# Patient Record
Sex: MALE | Race: BLACK OR AFRICAN AMERICAN | NOT HISPANIC OR LATINO | Employment: UNEMPLOYED | URBAN - METROPOLITAN AREA
[De-identification: names, ages, dates, MRNs, and addresses within clinical notes are randomized per-mention and may not be internally consistent; named-entity substitution may affect disease eponyms.]

---

## 2019-05-20 ENCOUNTER — OFFICE VISIT (OUTPATIENT)
Dept: GASTROENTEROLOGY | Facility: CLINIC | Age: 34
End: 2019-05-20
Payer: COMMERCIAL

## 2019-05-20 VITALS
HEIGHT: 71 IN | SYSTOLIC BLOOD PRESSURE: 146 MMHG | DIASTOLIC BLOOD PRESSURE: 88 MMHG | BODY MASS INDEX: 33.15 KG/M2 | HEART RATE: 70 BPM | WEIGHT: 236.8 LBS

## 2019-05-20 DIAGNOSIS — K62.89 RECTAL PAIN: Primary | ICD-10-CM

## 2019-05-20 DIAGNOSIS — K21.9 GASTROESOPHAGEAL REFLUX DISEASE WITHOUT ESOPHAGITIS: ICD-10-CM

## 2019-05-20 PROCEDURE — 99203 OFFICE O/P NEW LOW 30 MIN: CPT | Performed by: PHYSICIAN ASSISTANT

## 2019-05-28 ENCOUNTER — TELEPHONE (OUTPATIENT)
Dept: GASTROENTEROLOGY | Facility: CLINIC | Age: 34
End: 2019-05-28

## 2019-05-30 ENCOUNTER — TELEPHONE (OUTPATIENT)
Dept: GASTROENTEROLOGY | Facility: CLINIC | Age: 34
End: 2019-05-30

## 2021-05-11 ENCOUNTER — OFFICE VISIT (OUTPATIENT)
Dept: UROLOGY | Facility: CLINIC | Age: 36
End: 2021-05-11
Payer: COMMERCIAL

## 2021-05-11 VITALS
DIASTOLIC BLOOD PRESSURE: 102 MMHG | BODY MASS INDEX: 34.27 KG/M2 | HEART RATE: 76 BPM | SYSTOLIC BLOOD PRESSURE: 138 MMHG | WEIGHT: 244.8 LBS | HEIGHT: 71 IN

## 2021-05-11 DIAGNOSIS — Q55.69 WEBBED PENIS: Primary | ICD-10-CM

## 2021-05-11 PROCEDURE — 99204 OFFICE O/P NEW MOD 45 MIN: CPT | Performed by: PHYSICIAN ASSISTANT

## 2021-05-11 RX ORDER — AMINOCAPROIC ACID 500 MG/1
TABLET ORAL EVERY 6 HOURS
COMMUNITY
End: 2021-06-29

## 2021-05-11 RX ORDER — DIPHENOXYLATE HYDROCHLORIDE AND ATROPINE SULFATE 2.5; .025 MG/1; MG/1
1 TABLET ORAL DAILY
COMMUNITY

## 2021-05-11 NOTE — PROGRESS NOTES
1  Webbed penis  Case request operating room: SCROTOPLASTY    Case request operating room: SCROTOPLASTY       Assessment and plan:       1  Scrotal Webbing  - Patient was seen and examined both by myself and Dr Sophia Grullon today  Scrotal webbing noted at the penile scrotal junction  We reviewed options for observation versus scrotoplasty  Discussed the operative procedure including risks of bleeding, infection, and cosmetic changes  Also reviewed postoperative restrictions  Patient verbalized understanding and wishes to proceed with scrotoplasty at this time  All questions answered  Hector Ibarra PA-C      Chief Complaint     Chief Complaint   Patient presents with    Scrotal Webbing     pain w/ exercise, certain boxers       History of Present Illness     Daryle Big is a 38 y o  Male presenting today as a new patient for penile webbing  Patient states that he noticed this approximately 10-15 years ago  He does feel like it has worsened over the past few years however  He typically will notice this as he will have pain with penetrative intercourse due to the tension at the webbing  Does not typically have any pain with spontaneous erections however  He also notices difficulties with certain activities with pulling at this site  He has been doing conservative measures however overall bothersome to him  Patient does admit to a vasectomy approximately 31years of age  No complications thereafter  Patient denies any urinary issues  Denies any dysuria, gross hematuria, or urinary infections  Denies any history of coagulopathy/bleeding disorders or use of anticoagulation  Review of Systems     Review of Systems   Constitutional: Negative for activity change, appetite change, chills, diaphoresis, fatigue, fever and unexpected weight change  Respiratory: Negative for chest tightness and shortness of breath      Cardiovascular: Negative for chest pain, palpitations and leg swelling  Gastrointestinal: Negative for abdominal distention, abdominal pain, constipation, diarrhea, nausea and vomiting  Genitourinary: Negative for decreased urine volume, difficulty urinating, dysuria, enuresis, flank pain, frequency, genital sores, hematuria and urgency  Musculoskeletal: Negative for back pain, gait problem and myalgias  Skin: Negative for color change, pallor, rash and wound  Psychiatric/Behavioral: Negative for behavioral problems  The patient is not nervous/anxious  Allergies     No Known Allergies    Physical Exam     Physical Exam  Constitutional:       General: He is not in acute distress  Appearance: Normal appearance  He is normal weight  He is not ill-appearing, toxic-appearing or diaphoretic  HENT:      Head: Normocephalic and atraumatic  Eyes:      General:         Right eye: No discharge  Left eye: No discharge  Conjunctiva/sclera: Conjunctivae normal    Pulmonary:      Effort: Pulmonary effort is normal  No respiratory distress  Genitourinary:     Comments: Scrotal webbing present to approximately 1/3 of proximal penile shaft  No erythema or skin breakdown  Musculoskeletal: Normal range of motion  General: No swelling or tenderness  Skin:     General: Skin is warm and dry  Neurological:      General: No focal deficit present  Mental Status: He is alert and oriented to person, place, and time  Psychiatric:         Mood and Affect: Mood normal          Behavior: Behavior normal          Thought Content:  Thought content normal          Judgment: Judgment normal            Vital Signs     Vitals:    05/11/21 1034   BP: (!) 138/102   BP Location: Left arm   Patient Position: Sitting   Cuff Size: Large   Pulse: 76   Weight: 111 kg (244 lb 12 8 oz)   Height: 5' 11" (1 803 m)         Current Medications       Current Outpatient Medications:     aminocaproic acid (AMICAR) 500 mg tablet, Take by mouth every 6 (six) hours, Disp: , Rfl:     b complex vitamins tablet, Take 1 tablet by mouth daily, Disp: , Rfl:     multivitamin (THERAGRAN) TABS, Take 1 tablet by mouth daily, Disp: , Rfl:       Active Problems     There is no problem list on file for this patient  Past Medical History     History reviewed  No pertinent past medical history        Surgical History     Past Surgical History:   Procedure Laterality Date    VASECTOMY      VASECTOMY  2016         Family History     Family History   Problem Relation Age of Onset    No Known Problems Father     No Known Problems Paternal Grandmother     No Known Problems Daughter     No Known Problems Daughter     No Known Problems Son          Social History     Social History       Radiology

## 2021-05-12 ENCOUNTER — TELEPHONE (OUTPATIENT)
Dept: UROLOGY | Facility: CLINIC | Age: 36
End: 2021-05-12

## 2021-05-12 ENCOUNTER — PREP FOR PROCEDURE (OUTPATIENT)
Dept: UROLOGY | Facility: CLINIC | Age: 36
End: 2021-05-12

## 2021-06-29 NOTE — PRE-PROCEDURE INSTRUCTIONS
Pre-Surgery Instructions:   Medication Instructions    b complex vitamins tablet Instructed to avoid all ASA/NSAIDs and OTC Vit/Supp from now until after surgery  Tylenol ok prn    multivitamin (THERAGRAN) TABS Instructed to avoid all ASA/NSAIDs and OTC Vit/Supp from now until after surgery  Tylenol ok prn    Pre-op medication, and showering instructions with antibacteral soap reviewed  Instructed to avoid all ASA/NSAIDs and OTC Vit/Supp from now until after surgery  Tylenol ok prn  Pt  Verbalized an understanding of all instructions reviewed and offers no concerns at this time

## 2021-06-30 NOTE — TELEPHONE ENCOUNTER
Erasmo Zee from [facility] calling for patients consent for surgery scheduled for tomorrow   Fax to [phone]

## 2021-07-01 ENCOUNTER — TELEPHONE (OUTPATIENT)
Dept: UROLOGY | Facility: CLINIC | Age: 36
End: 2021-07-01

## 2021-07-01 ENCOUNTER — ANESTHESIA (OUTPATIENT)
Dept: PERIOP | Facility: HOSPITAL | Age: 36
End: 2021-07-01
Payer: COMMERCIAL

## 2021-07-01 ENCOUNTER — ANESTHESIA EVENT (OUTPATIENT)
Dept: PERIOP | Facility: HOSPITAL | Age: 36
End: 2021-07-01
Payer: COMMERCIAL

## 2021-07-01 ENCOUNTER — HOSPITAL ENCOUNTER (OUTPATIENT)
Facility: HOSPITAL | Age: 36
Setting detail: OUTPATIENT SURGERY
Discharge: HOME | End: 2021-07-01
Attending: UROLOGY | Admitting: UROLOGY
Payer: COMMERCIAL

## 2021-07-01 VITALS
TEMPERATURE: 97.1 F | DIASTOLIC BLOOD PRESSURE: 55 MMHG | SYSTOLIC BLOOD PRESSURE: 117 MMHG | OXYGEN SATURATION: 98 % | WEIGHT: 245.37 LBS | BODY MASS INDEX: 34.35 KG/M2 | HEIGHT: 71 IN | RESPIRATION RATE: 16 BRPM | HEART RATE: 71 BPM

## 2021-07-01 DIAGNOSIS — Q55.69 WEBBED PENIS: Primary | ICD-10-CM

## 2021-07-01 PROCEDURE — 88304 TISSUE EXAM BY PATHOLOGIST: CPT | Performed by: PATHOLOGY

## 2021-07-01 PROCEDURE — 55175 REVISION OF SCROTUM: CPT | Performed by: UROLOGY

## 2021-07-01 PROCEDURE — NC001 PR NO CHARGE: Performed by: UROLOGY

## 2021-07-01 RX ORDER — DEXMEDETOMIDINE HYDROCHLORIDE 100 UG/ML
INJECTION, SOLUTION INTRAVENOUS AS NEEDED
Status: DISCONTINUED | OUTPATIENT
Start: 2021-07-01 | End: 2021-07-01

## 2021-07-01 RX ORDER — FENTANYL CITRATE/PF 50 MCG/ML
50 SYRINGE (ML) INJECTION
Status: DISCONTINUED | OUTPATIENT
Start: 2021-07-01 | End: 2021-07-01 | Stop reason: HOSPADM

## 2021-07-01 RX ORDER — PROPOFOL 10 MG/ML
INJECTION, EMULSION INTRAVENOUS AS NEEDED
Status: DISCONTINUED | OUTPATIENT
Start: 2021-07-01 | End: 2021-07-01

## 2021-07-01 RX ORDER — BUPIVACAINE HYDROCHLORIDE 2.5 MG/ML
INJECTION, SOLUTION EPIDURAL; INFILTRATION; INTRACAUDAL AS NEEDED
Status: DISCONTINUED | OUTPATIENT
Start: 2021-07-01 | End: 2021-07-01 | Stop reason: HOSPADM

## 2021-07-01 RX ORDER — OXYCODONE HYDROCHLORIDE 5 MG/1
5 TABLET ORAL EVERY 4 HOURS PRN
Status: DISCONTINUED | OUTPATIENT
Start: 2021-07-01 | End: 2021-07-01 | Stop reason: HOSPADM

## 2021-07-01 RX ORDER — FENTANYL CITRATE 50 UG/ML
INJECTION, SOLUTION INTRAMUSCULAR; INTRAVENOUS AS NEEDED
Status: DISCONTINUED | OUTPATIENT
Start: 2021-07-01 | End: 2021-07-01

## 2021-07-01 RX ORDER — ONDANSETRON 2 MG/ML
INJECTION INTRAMUSCULAR; INTRAVENOUS AS NEEDED
Status: DISCONTINUED | OUTPATIENT
Start: 2021-07-01 | End: 2021-07-01

## 2021-07-01 RX ORDER — SODIUM CHLORIDE, SODIUM LACTATE, POTASSIUM CHLORIDE, CALCIUM CHLORIDE 600; 310; 30; 20 MG/100ML; MG/100ML; MG/100ML; MG/100ML
125 INJECTION, SOLUTION INTRAVENOUS CONTINUOUS
Status: DISCONTINUED | OUTPATIENT
Start: 2021-07-01 | End: 2021-07-01 | Stop reason: HOSPADM

## 2021-07-01 RX ORDER — LIDOCAINE HYDROCHLORIDE 10 MG/ML
INJECTION, SOLUTION EPIDURAL; INFILTRATION; INTRACAUDAL; PERINEURAL AS NEEDED
Status: DISCONTINUED | OUTPATIENT
Start: 2021-07-01 | End: 2021-07-01

## 2021-07-01 RX ORDER — DIPHENHYDRAMINE HYDROCHLORIDE 50 MG/ML
12.5 INJECTION INTRAMUSCULAR; INTRAVENOUS ONCE AS NEEDED
Status: DISCONTINUED | OUTPATIENT
Start: 2021-07-01 | End: 2021-07-01 | Stop reason: HOSPADM

## 2021-07-01 RX ORDER — DEXAMETHASONE SODIUM PHOSPHATE 10 MG/ML
INJECTION, SOLUTION INTRAMUSCULAR; INTRAVENOUS AS NEEDED
Status: DISCONTINUED | OUTPATIENT
Start: 2021-07-01 | End: 2021-07-01

## 2021-07-01 RX ORDER — ACETAMINOPHEN 325 MG/1
650 TABLET ORAL EVERY 4 HOURS PRN
Qty: 30 TABLET | Refills: 0
Start: 2021-07-01

## 2021-07-01 RX ORDER — CEFAZOLIN SODIUM 2 G/50ML
SOLUTION INTRAVENOUS AS NEEDED
Status: DISCONTINUED | OUTPATIENT
Start: 2021-07-01 | End: 2021-07-01

## 2021-07-01 RX ORDER — NAPROXEN 500 MG/1
500 TABLET ORAL 2 TIMES DAILY WITH MEALS
Qty: 10 TABLET | Refills: 0 | Status: SHIPPED | OUTPATIENT
Start: 2021-07-01 | End: 2021-12-27

## 2021-07-01 RX ORDER — HYDROMORPHONE HCL/PF 1 MG/ML
0.4 SYRINGE (ML) INJECTION
Status: DISCONTINUED | OUTPATIENT
Start: 2021-07-01 | End: 2021-07-01 | Stop reason: HOSPADM

## 2021-07-01 RX ORDER — MAGNESIUM HYDROXIDE 1200 MG/15ML
LIQUID ORAL AS NEEDED
Status: DISCONTINUED | OUTPATIENT
Start: 2021-07-01 | End: 2021-07-01 | Stop reason: HOSPADM

## 2021-07-01 RX ORDER — KETAMINE HCL IN NACL, ISO-OSM 100MG/10ML
SYRINGE (ML) INJECTION AS NEEDED
Status: DISCONTINUED | OUTPATIENT
Start: 2021-07-01 | End: 2021-07-01

## 2021-07-01 RX ORDER — OXYCODONE HYDROCHLORIDE 5 MG/1
5 TABLET ORAL EVERY 4 HOURS PRN
Qty: 5 TABLET | Refills: 0 | Status: SHIPPED | OUTPATIENT
Start: 2021-07-01 | End: 2021-07-06

## 2021-07-01 RX ORDER — MIDAZOLAM HYDROCHLORIDE 2 MG/2ML
INJECTION, SOLUTION INTRAMUSCULAR; INTRAVENOUS AS NEEDED
Status: DISCONTINUED | OUTPATIENT
Start: 2021-07-01 | End: 2021-07-01

## 2021-07-01 RX ORDER — KETOROLAC TROMETHAMINE 30 MG/ML
INJECTION, SOLUTION INTRAMUSCULAR; INTRAVENOUS AS NEEDED
Status: DISCONTINUED | OUTPATIENT
Start: 2021-07-01 | End: 2021-07-01

## 2021-07-01 RX ORDER — GLYCOPYRROLATE 0.2 MG/ML
INJECTION INTRAMUSCULAR; INTRAVENOUS AS NEEDED
Status: DISCONTINUED | OUTPATIENT
Start: 2021-07-01 | End: 2021-07-01

## 2021-07-01 RX ORDER — ONDANSETRON 2 MG/ML
4 INJECTION INTRAMUSCULAR; INTRAVENOUS ONCE AS NEEDED
Status: DISCONTINUED | OUTPATIENT
Start: 2021-07-01 | End: 2021-07-01 | Stop reason: HOSPADM

## 2021-07-01 RX ORDER — CEFAZOLIN SODIUM 2 G/50ML
2000 SOLUTION INTRAVENOUS ONCE
Status: DISCONTINUED | OUTPATIENT
Start: 2021-07-01 | End: 2021-07-01 | Stop reason: HOSPADM

## 2021-07-01 RX ADMIN — LIDOCAINE HYDROCHLORIDE 100 MG: 10 INJECTION, SOLUTION EPIDURAL; INFILTRATION; INTRACAUDAL; PERINEURAL at 14:04

## 2021-07-01 RX ADMIN — DEXMEDETOMIDINE HCL 8 MCG: 100 INJECTION INTRAVENOUS at 14:04

## 2021-07-01 RX ADMIN — PROPOFOL 200 MG: 10 INJECTION, EMULSION INTRAVENOUS at 14:04

## 2021-07-01 RX ADMIN — DEXMEDETOMIDINE HCL 8 MCG: 100 INJECTION INTRAVENOUS at 14:08

## 2021-07-01 RX ADMIN — FENTANYL CITRATE 50 MCG: 50 INJECTION, SOLUTION INTRAMUSCULAR; INTRAVENOUS at 14:07

## 2021-07-01 RX ADMIN — MIDAZOLAM HYDROCHLORIDE 2 MG: 1 INJECTION, SOLUTION INTRAMUSCULAR; INTRAVENOUS at 13:59

## 2021-07-01 RX ADMIN — DEXMEDETOMIDINE HCL 8 MCG: 100 INJECTION INTRAVENOUS at 14:12

## 2021-07-01 RX ADMIN — GLYCOPYRROLATE 0.2 MG: 0.2 INJECTION, SOLUTION INTRAMUSCULAR; INTRAVENOUS at 14:04

## 2021-07-01 RX ADMIN — ONDANSETRON 4 MG: 2 INJECTION INTRAMUSCULAR; INTRAVENOUS at 14:04

## 2021-07-01 RX ADMIN — CEFAZOLIN SODIUM 2000 MG: 2 SOLUTION INTRAVENOUS at 13:47

## 2021-07-01 RX ADMIN — DEXAMETHASONE SODIUM PHOSPHATE 10 MG: 10 INJECTION, SOLUTION INTRAMUSCULAR; INTRAVENOUS at 14:04

## 2021-07-01 RX ADMIN — KETOROLAC TROMETHAMINE 15 MG: 30 INJECTION, SOLUTION INTRAMUSCULAR at 15:14

## 2021-07-01 RX ADMIN — FENTANYL CITRATE 50 MCG: 50 INJECTION, SOLUTION INTRAMUSCULAR; INTRAVENOUS at 14:10

## 2021-07-01 RX ADMIN — SODIUM CHLORIDE, SODIUM LACTATE, POTASSIUM CHLORIDE, AND CALCIUM CHLORIDE 125 ML/HR: .6; .31; .03; .02 INJECTION, SOLUTION INTRAVENOUS at 12:09

## 2021-07-01 RX ADMIN — Medication 25 MG: at 14:14

## 2021-07-01 NOTE — OP NOTE
Operative Note     PATIENT:  Governor Tan (MRN [id number])    DATE OF PROCEDURE:   7/1/2021    PRE-OP DIAGNOSIS:   1) penoscrotal webbing    POST-OP DIAGNOSIS:   1) penoscrotal webbing    PROCEDURES PERFORMED:  1) scrotoplasty    SURGEON:  Pancho Vega MD    ASSISTANTS:    NOTE:  There were no qualified teaching residents to assist with this case    ANESTHESIA: General     COMPLICATIONS:   None    ANTIBIOTICS:  Cephazolin    INTRAOPERATIVE THROMBOEMBOLISM PROPHYLAXIS:  Pneumatic compression stockings     INDICATIONS FOR PROCEDURE:  Governor Tan is an 38 y o  old male with penoscrotal webbing  Patient's scrotum inserts at the mid shaft  He is symptomatic and desires surgical repair  We discussed options including conservative/expected versus formal repair with a scrotoplasty  Various techniques were discussed  After discussing the options, the patient elected to undergo a scrotoplasty  We discussed the procedure in detail, the alternatives, and the risks, and they signed informed consent to proceed  PROCEDURE IN DETAIL:     Before induction of anesthesia, I examined the patient with him awake in the preop holding area and marked the excess scrotal skin for planned excision to our mutual satisfaction  The patient was identified and brought to the OR  Antibiotic prophylaxis and DVT prophylaxis were administered  They were placed in the comfortable supine position with care to pad all pressure points  The scrotal hair was clipped and they were prepped and draped in the usual sterile fashion using ChloraPrep  A surgical time out was performed with all in the room in agreement with the correct patient, procedure, indications, and laterality          The shaft of the penis in the base of the scrotum and scrotal contents were placed on gentle retraction and a v-shaped incision was made along the ventral surface of the base of the penis superiorly, and along the anterior surface of the scrotum inferiorly  This was carried down through the cremasteric fibers using electrocautery  Care was taken to avoid any injury to the corporal bodies and scrotal contents  The tunica vaginalis was violated in 2 locations these were carefully closed to avoid any leakage of hydrocele fluid  The skin was completely excised using electrocautery and submitted as specimen  The wound was copiously irrigated and hemostasis was meticulously ensured using Bovie electrocautery  Next the incision was closed longitudinally utilizing multiple 3-0 chromic sutures in a horizontal mattress interrupted fashion  This gave an excellent result  And I was satisfied with complete closure and excellent cosmesis, the skin was cleansed  The skin was closed in addition using a thin layer of Exofin glue  A sterile dressing and scrotal support was applied  All needle and instrument counts were correct  The patient was placed back supine, awakened from general anesthesia and brought to recovery room in stable condition      ESTIMATED BLOOD LOSS:  Minimal      DRAINS:    None    SPECIMENS:   Order Name Source Comment Collection Info Order Time   TISSUE EXAM Skin, Other SCROTAL SKIN  Collected By: Cele Nath MD 7/1/2021  2:34 PM     [facility]   Immediate             COMPLICATIONS: None    DISPOSITION: PACU

## 2021-07-01 NOTE — TELEPHONE ENCOUNTER
Status post scrotoplasty    Please schedule a follow-up postop visit with me in 1-2 weeks, ideally on a day when SERGE Schwartz is in the office as well  Can double book the visit with me      There should be availability on July 8th

## 2021-07-01 NOTE — H&P
UROLOGY HISTORY AND PHYSICAL     Patient Identifiers: Rosaline Wick (MRN [id number])      Date of Service: 2021        ASSESSMENT:     38 y o  old male with penoscrotal webbing   PLAN:     To OR for scrotoplasty      History of Present Illness:     Rosaline Wick is a 38 y o  old with a history of penoscrotal webbing    Past Medical, Past Surgical History:   History reviewed  No pertinent past medical history :    Past Surgical History:   Procedure Laterality Date    VASECTOMY      VASECTOMY  2016   :    Medications, Allergies:     Current Facility-Administered Medications:     ceFAZolin (ANCEF) IVPB (premix in dextrose) 2,000 mg 50 mL, 2,000 mg, Intravenous, Once, Sandi Yang PA-C    lactated ringers infusion, 125 mL/hr, Intravenous, Continuous, Tigre Ruggiero MD, Last Rate: 125 mL/hr at 21 1209, 125 mL/hr at 21 1209    Allergies:  No Known Allergies:    Social and Family History:   Social History:   Social History     Tobacco Use    Smoking status: Former Smoker     Packs/day: 0 25     Quit date: 2021     Years since quittin 2    Smokeless tobacco: Never Used   Vaping Use    Vaping Use: Never used   Substance Use Topics    Alcohol use: Yes     Comment: social 3x a year    Drug use: Never     Social History     Tobacco Use   Smoking Status Former Smoker    Packs/day: 0 25    Quit date: 2021    Years since quittin 2   Smokeless Tobacco Never Used       Family History:  Family History   Problem Relation Age of Onset    No Known Problems Father     No Known Problems Paternal Grandmother     No Known Problems Daughter     No Known Problems Daughter     No Known Problems Son    :     Review of Systems:     General: Fever, chills, or night sweats: negative  Cardiac: Negative for chest pain  Pulmonary: Negative for shortness of breath  Gastrointestinal: Abdominal pain negative  Nausea, vomiting, or diarrhea negative  Genitourinary: See HPI above  Patient does nothave hematuria  All other systems queried were negative  Physical Exam:   General: Patient is pleasant and in NAD  Awake and alert  /57   Pulse 65   Temp 98 3 °F (36 8 °C) (Temporal)   Resp 20   Ht 5' 11" (1 803 m)   Wt 111 kg (245 lb 6 oz)   SpO2 97%   BMI 34 22 kg/m²   HEENT:  Normocephalic atraumatic  Cardiac:  Regular rate and rhythm, Peripheral edema: negative  Pulmonary: Non-labored breathing, CTAB  Abdomen: Soft, non-tender, non-distended  No surgical scars  No masses, tenderness, hernias noted  Genitourinary: negative CVA tenderness, neg suprapubic tenderness  Extremities: normal movement in all 4       Labs:   No results found for: HGB, HCT, WBC, PLT]    No results found for: NA, K, CL, CO2, BUN, CREATININE, CALCIUM, GLUCOSE]    Imaging:   I personally reviewed the images and report of the following studies, and reviewed them with the patient:        Thank you for allowing me to participate in this patients care  Please do not hesitate to call with any additional questions    Manuela Frazier MD

## 2021-07-01 NOTE — DISCHARGE INSTRUCTIONS
Scrotoplasty  WHAT YOU NEED TO KNOW:   A scrotoplasty is a surgery to repair penoscrotal webbing  DISCHARGE INSTRUCTIONS:     Please purchase to appropriately fitting jock straps, wear them at all times until your postoperative visit, placing clean gauze underneath the scrotum to provide further elevation  Medicines:   · Pain medicine: You may need medicine to take away or decrease pain  ? Learn how to take your medicine  Ask what medicine and how much you should take  Be sure you know how, when, and how often to take it  ? Do not wait until the pain is severe before you take your medicine  Tell your healthcare provider if your pain does not decrease  ? Pain medicine can make you dizzy or sleepy  Prevent falls by calling someone when you get out of bed or if you need help  Take your medicine as directed  Contact your healthcare provider if you think your medicine is not helping or if you have side effects  Tell him of her if you are allergic to any medicine  Keep a list of the medicines, vitamins, and herbs you take  Include the amounts, and when and why you take them  Bring the list or the pill bottles to follow-up visits  Carry your medicine list with you in case of an emergency  Follow up with your healthcare provider or urologist as directed:  Write down your questions so you remember to ask them during your visits  Support: You may need to wear a fabric support device similar to a jock strap to decrease swelling  Contact your healthcare provider or urologist if:   · The swelling gets worse or does not go away  · You have questions or concerns about your condition or care  Return to the emergency department if:   · You have severe pain in your scrotum  · You have a fever and your scrotum is red and swollen  © Copyright [address] Information is for End User's use only and may not be sold, redistributed or otherwise used for commercial purposes   All illustrations and images included in CareNotes® are the copyrighted property of A D A M [employer] Inc  or [employer]  The above information is an  only  It is not intended as medical advice for individual conditions or treatments  Talk to your doctor, nurse or pharmacist before following any medical regimen to see if it is safe and effective for you

## 2021-07-01 NOTE — ANESTHESIA POSTPROCEDURE EVALUATION
Post-Op Assessment Note    CV Status:  Stable  Pain Score: 0    Pain management: adequate     Mental Status:  Sleepy   Hydration Status:  Euvolemic   PONV Controlled:  Controlled   Airway Patency:  Patent and adequate      Post Op Vitals Reviewed: Yes      Staff: CRNA, Anesthesiologist   Comments: Oral airway in place  RN aware        No complications documented      BP (P) 119/56 (07/01/21 1532)    Temp (P) 98 7 °F (37 1 °C) (07/01/21 1532)    Pulse  81   Resp      SpO2   96%

## 2021-07-01 NOTE — ANESTHESIA PREPROCEDURE EVALUATION
Procedure:  SCROTOPLASTY (N/A Scrotum)    Relevant Problems   No relevant active problems      Webbed penis   Recently quit smoking  Obesity  Physical Exam    Airway    Mallampati score: III  TM Distance: >3 FB  Neck ROM: full     Dental   Comment: Denies loose teeth,     Cardiovascular  Cardiovascular exam normal    Pulmonary  Pulmonary exam normal     Other Findings  Portions of exam deferred due to low yield and/or unknown COVID status      Anesthesia Plan  ASA Score- 2     Anesthesia Type- general with ASA Monitors  Additional Monitors:   Airway Plan: LMA  Plan Factors-Exercise tolerance (METS): >4 METS  Chart reviewed  Existing labs reviewed  Patient summary reviewed  Patient is not a current smoker  Induction- intravenous  Postoperative Plan-     Informed Consent- Anesthetic plan and risks discussed with patient  I personally reviewed this patient with the CRNA  Discussed and agreed on the Anesthesia Plan with the CRNA  Gege Moraes

## 2021-07-02 ENCOUNTER — TELEPHONE (OUTPATIENT)
Dept: UROLOGY | Facility: MEDICAL CENTER | Age: 36
End: 2021-07-02

## 2021-07-02 NOTE — TELEPHONE ENCOUNTER
pts care is managed by Ashlyn Davidson  Last seen 7/1/21  Scrotoplasty  Pt reports as of today 1 stitch has fallen out of surgical site  Pt states area was actively bleeding today 1130 am  Pt states he placed neosporin and re wrapped area  Pt states area where stitch was is actively bleeding  Please advise further instructions

## 2021-07-02 NOTE — TELEPHONE ENCOUNTER
Patient of Dr Hudson Truong in [facility]  Patient had procedure yesterday and has questions regarding his limitations  Please call at [phone]

## 2021-07-02 NOTE — TELEPHONE ENCOUNTER
Reviewed with Dr Davis Reap - patient should continue with neosporin and keeping area covered, and bleeding should stop soon  Additional restrictions include avoidance of intercourse/masturbation while healing

## 2021-07-02 NOTE — TELEPHONE ENCOUNTER
Contacted pt with update on PA-C and Dr Lomas comments regarding one stitch falling out post Scrotplpasty 7/1/21    Pt with complete understanding no questions or concerns  Pt is grateful for the call back  Pt will continue to monitor through out the next few days and will call back if symptoms continue      Thank you

## 2021-07-02 NOTE — TELEPHONE ENCOUNTER
Avoid any strenuous activity or heavy lifting >30lbs for the next 2 weeks until post-op  Avoid hot tubs/pools/oceans/lakes etc in the meantime

## 2021-07-02 NOTE — TELEPHONE ENCOUNTER
Patient is S/P Scrotoplasty on 07/01/2021 with Dr Ramin Engel  Patient is calling in asking with his limitations are  Please review  Thanks!

## 2021-07-02 NOTE — TELEPHONE ENCOUNTER
Patient called in stating it looks like a stitch came out because the area is bleeding and looks like a little hole   Patient can be reached at [phone]

## 2021-07-06 NOTE — TELEPHONE ENCOUNTER
Unfortunately Trey WHEELER is not in the office 7/8/21 and that is the only availability with Dr Shayna Jimenez in recommended time frame  Patient scheduled for 7/8/21 at 830am        Called and spoke with patient at this time  Reviewed issue with silva (in additional telephone note from 7/2/21 ) He reports that is much better and he is utilizing supportive measures  Advised Dr Shayna Jimenez was hoping to see patient approx 1 week post op  He is able to make appt on 7/8/21 at 830am in the [facility] office  He knows to call in the meantime if any other questions/concerns arise

## 2021-07-08 ENCOUNTER — OFFICE VISIT (OUTPATIENT)
Dept: UROLOGY | Facility: CLINIC | Age: 36
End: 2021-07-08

## 2021-07-08 VITALS
WEIGHT: 243 LBS | HEIGHT: 71 IN | HEART RATE: 69 BPM | DIASTOLIC BLOOD PRESSURE: 86 MMHG | BODY MASS INDEX: 34.02 KG/M2 | SYSTOLIC BLOOD PRESSURE: 110 MMHG

## 2021-07-08 DIAGNOSIS — Q55.69 WEBBED PENIS: Primary | ICD-10-CM

## 2021-07-08 PROCEDURE — 99024 POSTOP FOLLOW-UP VISIT: CPT | Performed by: UROLOGY

## 2021-07-08 NOTE — PROGRESS NOTES
Referring Physician: Jo-Ann Vallejo DO  A copy of this note was sent to the referring physician  Diagnoses and all orders for this visit:    Webbed penis            Assessment and plan:       1  Penoscrotal webbing  - status post scrotoplasty     patient has had an excellent result  He is very happy with the cosmetic and functional result  There is 1 3 mm area of incisional dehiscence in the inferior most aspect of the incision  I have recommended continued conservative management with t i d  topical antibiotic ointment and clean gauze to cover the incision  I counseled Rory Eng will likely take approximately 2 weeks for this to close completely  I expect his final results will be excellent  We will plan for 1 additional postoperative visit with our advanced practitioner team to ensure that the incision is healing well  Pancho Vega MD      Chief Complaint       Postop      History of Present Illness     Governor Tan is a 38 y o  Male returns in follow-up status post scrotal plasty performed for the indication of penoscrotal webbing    Detailed Urologic History     - please refer to HPI    Review of Systems     Review of Systems   Constitutional: Negative for activity change and fatigue  HENT: Negative for congestion  Eyes: Negative for visual disturbance  Respiratory: Negative for shortness of breath and wheezing  Cardiovascular: Negative for chest pain and leg swelling  Gastrointestinal: Negative for abdominal pain  Endocrine: Negative for polyuria  Genitourinary: Negative for dysuria, flank pain, hematuria and urgency  Musculoskeletal: Negative for back pain  Allergic/Immunologic: Negative for immunocompromised state  Neurological: Negative for dizziness and numbness  Psychiatric/Behavioral: Negative for dysphoric mood  All other systems reviewed and are negative              Allergies     No Known Allergies    Physical Exam     Physical Exam  Constitutional:       General: He is not in acute distress  Appearance: He is well-developed  HENT:      Head: Normocephalic and atraumatic  Pulmonary:      Effort: Pulmonary effort is normal       Breath sounds: Normal breath sounds  Abdominal:      Palpations: Abdomen is soft  Genitourinary:     Comments: Longitudinal penoscrotal incision is well approximated  There is a 3 mm area of superficial appearing dehiscence at the inferior most (scrotal ) aspect of the incision  There is no drainage or purulence  The remaining suture line is intact  There is an excellent cosmetic result  There is no ecchymoses along the shaft or palpable scrotal hematoma  Musculoskeletal:         General: Normal range of motion  Cervical back: Normal range of motion  Skin:     General: Skin is warm  Neurological:      Mental Status: He is alert and oriented to person, place, and time  Psychiatric:         Behavior: Behavior normal              Vital Signs  Vitals:    07/08/21 0837   BP: 110/86   Pulse: 69   Weight: 110 kg (243 lb)   Height: 5' 11" (1 803 m)         Current Medications       Current Outpatient Medications:     b complex vitamins tablet, Take 1 tablet by mouth daily, Disp: , Rfl:     multivitamin (THERAGRAN) TABS, Take 1 tablet by mouth daily, Disp: , Rfl:     acetaminophen (TYLENOL) 325 mg tablet, Take 2 tablets (650 mg total) by mouth every 4 (four) hours as needed for mild pain (Patient not taking: Reported on 7/8/2021), Disp: 30 tablet, Rfl: 0    naproxen (NAPROSYN) 500 mg tablet, Take 1 tablet (500 mg total) by mouth 2 (two) times a day with meals for 5 days For pain, Disp: 10 tablet, Rfl: 0      Active Problems     There is no problem list on file for this patient  Past Medical History     History reviewed  No pertinent past medical history        Surgical History     Past Surgical History:   Procedure Laterality Date    IA REVISION OF SCROTUM,SIMPLE N/A 7/1/2021 Procedure: Angelica Rodriguez;  Surgeon: Erin Mullen MD;  Location: [facility];  Service: Urology   [address]           Family History     Family History   Problem Relation Age of Onset    No Known Problems Father     No Known Problems Paternal Grandmother     No Known Problems Daughter     No Known Problems Daughter     No Known Problems Son          Social History     Social History     Social History     Tobacco Use   Smoking Status Former Smoker    Packs/day: 0 25    Quit date: 2021    Years since quittin 2   Smokeless Tobacco Never Used         Pertinent Lab Values     No results found for: CREATININE    No results found for: PSA    @RESULTRCNT(1H])@      Pertinent Imaging      - n/a    Portions of the record may have been created with voice recognition software   Occasional wrong word or "sound a like" substitutions may have occurred due to the inherent limitations of voice recognition software   Read the chart carefully and recognize, using context, where substitutions have occurred

## 2021-08-06 ENCOUNTER — EVALUATION (OUTPATIENT)
Dept: PHYSICAL THERAPY | Facility: CLINIC | Age: 36
End: 2021-08-06
Payer: OTHER MISCELLANEOUS

## 2021-08-06 DIAGNOSIS — M54.41 CHRONIC BILATERAL LOW BACK PAIN WITH RIGHT-SIDED SCIATICA: ICD-10-CM

## 2021-08-06 DIAGNOSIS — G89.29 CHRONIC BILATERAL LOW BACK PAIN WITH RIGHT-SIDED SCIATICA: ICD-10-CM

## 2021-08-06 DIAGNOSIS — Z98.890 S/P LUMBAR DISCECTOMY: Primary | ICD-10-CM

## 2021-08-06 PROCEDURE — 97112 NEUROMUSCULAR REEDUCATION: CPT

## 2021-08-06 PROCEDURE — 97161 PT EVAL LOW COMPLEX 20 MIN: CPT

## 2021-08-06 PROCEDURE — 97110 THERAPEUTIC EXERCISES: CPT

## 2021-08-06 NOTE — PROGRESS NOTES
PT Evaluation     Today's date: 2021  Patient name: Johnson Persaud  : 1985  MRN: [id number]  Referring provider: Daquan Radford MD  Dx:   Encounter Diagnosis     ICD-10-CM    1  S/P lumbar discectomy  Z98 890    2  Chronic bilateral low back pain with right-sided sciatica  M54 41     G89 29        Start Time: 1445  Stop Time: 1530  Total time in clinic (min): 45 minutes    Assessment  Assessment details: Johnson Persaud is a pleasant 38 y o  male who presents s/p lumbar discectomy  Pt demonstrates painful and limited lumbar ROM and hypomobility in his lower lumbar and sacral spine  Pt had peripheralization with repeated flexion after only a few attempts and had centralization with extension  Pt demonstrated B/L increased neural tension and irritation of sciatic and femoral nerves  Pt had pain with passive hip motion and MMTs on B/L LEs with R worse than L  Pt demonstrated difficulty motor planning for MMTs requiring multiple attempts to hold the position but was moderately strong following cueing and attempts  Pt was educated on avoiding flexion based stretches as this is what reproduces his LE pain  Pt was given an HEP that was focused on extension biased exercise and neural mobility  The patient's greatest concerns are the pain he is experiencing, concern at no signs of improvement, fear of not being able to keep active and future ill health (and wanting to prevent it)  No further referral appears necessary at this time based upon examination results  Primary movement impairment diagnosis of lumbar derrangement resulting in pathoanatomical symptoms of decreased lumbar ROM, altered neural mobility, pain with movement and limiting his ability to drive, exercise or recreation, perform household chores, perform yard work, sit, sleep, squat to  objects from the floor and stand      Primary Impairments:  1) altered neural mobility  2) Decreased lumbar mobility/ROM   3) LE weakness    Etiologic factors include recent injury at work and surgery  Impairments: abnormal or restricted ROM, activity intolerance, impaired physical strength, lacks appropriate home exercise program, pain with function and poor posture     Symptom irritability: highUnderstanding of Dx/Px/POC: good   Prognosis: good  Prognosis details: Positive prognostic indicators include positive attitude toward recovery, good understanding of diagnosis and treatment plan options and absence of observed red flags  Negative prognostic indicators include chronicity of symptoms, hypertension, high symptom irritability and multiple prior failed treatments  Goals  Short Term Goals: to be achieved by 4 weeks  1) Patient to be independent with basic HEP  2) Decrease pain to 4/10 at its worst  3) Increase lumbar spine AROM by 25% in all deficient planes   4) Increase LE strength by 1/2 MMT grade in all deficient planes    Long Term Goals: to be achieved by discharge  1) FOTO equal to or greater than 54  2) Patient to be independent with comprehensive HEP  3) Lumbar spine ROM WNL all planes to improve a/iadls  4) Increase LE strength to 5/5 MMT grade in all planes to improve a/iadls  5) Patient to report no sleep interruption secondary to pain  6) Increase tolerance for seated activities to >60 min  Plan  Patient would benefit from: skilled physical therapy  Planned modality interventions: Modalities PRN    Planned therapy interventions: activity modification, manual therapy, neuromuscular re-education, patient education, therapeutic activities, therapeutic exercise, graded activity, home exercise program, behavior modification and self care  Frequency: 2x week  Duration in weeks: 8  Treatment plan discussed with: patient        Subjective Evaluation    History of Present Illness  Mechanism of injury: History of Current Injury: Pt hurt his back lifting tables at his work in February 2020 and was performing PT for around 1 year prior to having his discectomy  Since the surgery pt is unable to tolerate greater than 15-20 minutes in the car prior to pain  Pt has to take his time with ADLs such as dressing due to pain  Pt is able to navigate stairs reciprocally however takes his time  Surgery date: lumbar discectomy 2021  Pain location/Descriptors: Intermittent diffuse low back pain that at times can shoot up his back  Aggravating factors: sitting for an extended period of time, any strenuous activity with bending/lifting/twisting  Easing factors: stretching  AM/PM pattern: random, no pattern  Imaging: MRI showed disc protrusion  Special Questions: Pt has intermittent N/T into his R LE that is less frequent than prior to the surgery however still occurs    Patient concerns: Pt wants to be able to get back to working out and lifting weights   Occupation: [employer]              Not a recurrent problem   Quality of life: good    Pain  Current pain ratin  At best pain ratin  At worst pain ratin    Social Support  Steps to enter house: yes  Stairs in house: yes   Lives in: multiple-level home  Lives with: young children and spouse    Employment status: not working  Hand dominance: right          Objective     Neurological Testing     Sensation     Lumbar   Left   Intact: light touch    Right   Diminished: light touch    Comments   Right light touch: L4 - S1    Active Range of Motion     Lumbar   Flexion:  Restriction level: minimal  Extension:  Restriction level: moderate  Left lateral flexion:  Restriction level: minimal  Right lateral flexion:  Restriction level: minimal  Left rotation:  with pain Restriction level: moderate  Right rotation:  with pain    Joint Play     Hypomobile: L3, L4, L5 and S1     Pain: L3, L4, L5 and S1   Mechanical Assessment    Cervical      Thoracic      Lumbar    Standing flexion: repeated movements   Pain location:peripheralized  Pain intensity: worse  Pain level: increased  Standing extension: repeated movements  Pain location: centralized  Pain intensity: better  Pain level: decreased    Strength/Myotome Testing     Lumbar   Left   Heel walk: normal  Toe walk: normal    Right   Heel walk: normal  Toe walk: normal    Left Hip   Planes of Motion   Flexion: 4  Abduction: 4  Adduction: 4+  External rotation: 4+  Internal rotation: 4+    Right Hip   Planes of Motion   Flexion: 4  Abduction: 4-  Adduction: 4+  External rotation: 4  Internal rotation: 4    Left Knee   Flexion: 4+  Extension: 4+    Right Knee   Flexion: 4  Extension: 4    Left Ankle/Foot   Dorsiflexion: 4+  Plantar flexion: 4+  Inversion: 4+  Eversion: 4+  Great toe flexion: 4+  Great toe extension: 4+    Right Ankle/Foot   Dorsiflexion: 4+  Plantar flexion: 4+  Inversion: 4+  Eversion: 4+  Great toe flexion: 4+  Great toe extension: 4+    Tests     Lumbar     Left   Positive femoral stretch, passive SLR and slump test      Right   Positive femoral stretch, passive SLR and slump test               Precautions: lumbar discectomy       8/6            Manuals                                                                 Neuro Re-Ed             Sciatic nerve glide x15 HEP                                                                                          Ther Ex             Prone quad stretch 4x30" HEP            Prone on elbows 2x1'            Prone press ups 2x10 HEP                                                                             Ther Activity                                       Gait Training                                       Modalities                                       Assessment IE, POC, Prognosis            Education HEP, POC, Prognosis

## 2021-08-06 NOTE — LETTER
2021    Pérez Garza MD  [facility]    Patient: Johnson Persaud   YOB: 1985   Date of Visit: 2021     Encounter Diagnosis     ICD-10-CM    1  S/P lumbar discectomy  Z98 890    2  Chronic bilateral low back pain with right-sided sciatica  M54 41     G89 29        Dear Dr Navarro Grandchild: Thank you for your recent referral of Johnson Persaud  Please review the attached evaluation summary from Davide's recent visit  Please verify that you agree with the plan of care by signing the attached order  If you have any questions or concerns, please do not hesitate to call  I sincerely appreciate the opportunity to share in the care of one of your patients and hope to have another opportunity to work with you in the near future  Sincerely,    Perla Jimenes, PT      Referring Provider:      I certify that I have read the below Plan of Care and certify the need for these services furnished under this plan of treatment while under my care  Pérez Garza MD  [address] [place] [id number]  Via Fax: [phone]          PT Evaluation     Today's date: 2021  Patient name: Johnson Persaud  : 1985  MRN: [id number]  Referring provider: Daquan Radford MD  Dx:   Encounter Diagnosis     ICD-10-CM    1  S/P lumbar discectomy  Z98 890    2  Chronic bilateral low back pain with right-sided sciatica  M54 41     G89 29        Start Time: 1445  Stop Time: 1530  Total time in clinic (min): 45 minutes    Assessment  Assessment details: Johnson Persaud is a pleasant 38 y o  male who presents s/p lumbar discectomy  Pt demonstrates painful and limited lumbar ROM and hypomobility in his lower lumbar and sacral spine  Pt had peripheralization with repeated flexion after only a few attempts and had centralization with extension  Pt demonstrated B/L increased neural tension and irritation of sciatic and femoral nerves   Pt had pain with passive hip motion and MMTs on B/L LEs with R worse than L  Pt demonstrated difficulty motor planning for MMTs requiring multiple attempts to hold the position but was moderately strong following cueing and attempts  Pt was educated on avoiding flexion based stretches as this is what reproduces his LE pain  Pt was given an HEP that was focused on extension biased exercise and neural mobility  The patient's greatest concerns are the pain he is experiencing, concern at no signs of improvement, fear of not being able to keep active and future ill health (and wanting to prevent it)  No further referral appears necessary at this time based upon examination results  Primary movement impairment diagnosis of lumbar derrangement resulting in pathoanatomical symptoms of decreased lumbar ROM, altered neural mobility, pain with movement and limiting his ability to drive, exercise or recreation, perform household chores, perform yard work, sit, sleep, squat to  objects from the floor and stand  Primary Impairments:  1) altered neural mobility  2) Decreased lumbar mobility/ROM   3) LE weakness    Etiologic factors include recent injury at work and surgery  Impairments: abnormal or restricted ROM, activity intolerance, impaired physical strength, lacks appropriate home exercise program, pain with function and poor posture     Symptom irritability: highUnderstanding of Dx/Px/POC: good   Prognosis: good  Prognosis details: Positive prognostic indicators include positive attitude toward recovery, good understanding of diagnosis and treatment plan options and absence of observed red flags  Negative prognostic indicators include chronicity of symptoms, hypertension, high symptom irritability and multiple prior failed treatments        Goals  Short Term Goals: to be achieved by 4 weeks  1) Patient to be independent with basic HEP  2) Decrease pain to 4/10 at its worst  3) Increase lumbar spine AROM by 25% in all deficient planes   4) Increase LE strength by 1/2 MMT grade in all deficient planes    Long Term Goals: to be achieved by discharge  1) FOTO equal to or greater than 54  2) Patient to be independent with comprehensive HEP  3) Lumbar spine ROM WNL all planes to improve a/iadls  4) Increase LE strength to 5/5 MMT grade in all planes to improve a/iadls  5) Patient to report no sleep interruption secondary to pain  6) Increase tolerance for seated activities to >60 min  Plan  Patient would benefit from: skilled physical therapy  Planned modality interventions: Modalities PRN  Planned therapy interventions: activity modification, manual therapy, neuromuscular re-education, patient education, therapeutic activities, therapeutic exercise, graded activity, home exercise program, behavior modification and self care  Frequency: 2x week  Duration in weeks: 8  Treatment plan discussed with: patient        Subjective Evaluation    History of Present Illness  Mechanism of injury: History of Current Injury: Pt hurt his back lifting tables at his work in February 2020 and was performing PT for around 1 year prior to having his discectomy  Since the surgery pt is unable to tolerate greater than 15-20 minutes in the car prior to pain  Pt has to take his time with ADLs such as dressing due to pain  Pt is able to navigate stairs reciprocally however takes his time  Surgery date: lumbar discectomy June 30th, 2021  Pain location/Descriptors: Intermittent diffuse low back pain that at times can shoot up his back  Aggravating factors: sitting for an extended period of time, any strenuous activity with bending/lifting/twisting  Easing factors: stretching  AM/PM pattern: random, no pattern  Imaging: MRI showed disc protrusion  Special Questions: Pt has intermittent N/T into his R LE that is less frequent than prior to the surgery however still occurs    Patient concerns: Pt wants to be able to get back to working out and lifting weights   Occupation: [employer]              Not a recurrent problem   Quality of life: good    Pain  Current pain ratin  At best pain ratin  At worst pain ratin    Social Support  Steps to enter house: yes  Stairs in house: yes   Lives in: multiple-level home  Lives with: young children and spouse    Employment status: not working  Hand dominance: right          Objective     Neurological Testing     Sensation     Lumbar   Left   Intact: light touch    Right   Diminished: light touch    Comments   Right light touch: L4 - S1    Active Range of Motion     Lumbar   Flexion:  Restriction level: minimal  Extension:  Restriction level: moderate  Left lateral flexion:  Restriction level: minimal  Right lateral flexion:  Restriction level: minimal  Left rotation:  with pain Restriction level: moderate  Right rotation:  with pain    Joint Play     Hypomobile: L3, L4, L5 and S1     Pain: L3, L4, L5 and S1   Mechanical Assessment    Cervical      Thoracic      Lumbar    Standing flexion: repeated movements   Pain location:peripheralized  Pain intensity: worse  Pain level: increased  Standing extension: repeated movements  Pain location: centralized  Pain intensity: better  Pain level: decreased    Strength/Myotome Testing     Lumbar   Left   Heel walk: normal  Toe walk: normal    Right   Heel walk: normal  Toe walk: normal    Left Hip   Planes of Motion   Flexion: 4  Abduction: 4  Adduction: 4+  External rotation: 4+  Internal rotation: 4+    Right Hip   Planes of Motion   Flexion: 4  Abduction: 4-  Adduction: 4+  External rotation: 4  Internal rotation: 4    Left Knee   Flexion: 4+  Extension: 4+    Right Knee   Flexion: 4  Extension: 4    Left Ankle/Foot   Dorsiflexion: 4+  Plantar flexion: 4+  Inversion: 4+  Eversion: 4+  Great toe flexion: 4+  Great toe extension: 4+    Right Ankle/Foot   Dorsiflexion: 4+  Plantar flexion: 4+  Inversion: 4+  Eversion: 4+  Great toe flexion: 4+  Great toe extension: 4+    Tests     Lumbar Left   Positive femoral stretch, passive SLR and slump test      Right   Positive femoral stretch, passive SLR and slump test               Precautions: lumbar discectomy       8/6            Manuals                                                                 Neuro Re-Ed             Sciatic nerve glide x15 HEP                                                                                          Ther Ex             Prone quad stretch 4x30" HEP            Prone on elbows 2x1'            Prone press ups 2x10 HEP                                                                             Ther Activity                                       Gait Training                                       Modalities                                       Assessment IE, POC, Prognosis            Education HEP, POC, Prognosis

## 2021-08-10 ENCOUNTER — OFFICE VISIT (OUTPATIENT)
Dept: PHYSICAL THERAPY | Facility: CLINIC | Age: 36
End: 2021-08-10
Payer: OTHER MISCELLANEOUS

## 2021-08-10 DIAGNOSIS — G89.29 CHRONIC BILATERAL LOW BACK PAIN WITH RIGHT-SIDED SCIATICA: ICD-10-CM

## 2021-08-10 DIAGNOSIS — M54.41 CHRONIC BILATERAL LOW BACK PAIN WITH RIGHT-SIDED SCIATICA: ICD-10-CM

## 2021-08-10 DIAGNOSIS — Z98.890 S/P LUMBAR DISCECTOMY: Primary | ICD-10-CM

## 2021-08-10 PROCEDURE — 97112 NEUROMUSCULAR REEDUCATION: CPT

## 2021-08-10 PROCEDURE — 97110 THERAPEUTIC EXERCISES: CPT

## 2021-08-10 PROCEDURE — 97140 MANUAL THERAPY 1/> REGIONS: CPT

## 2021-08-10 NOTE — PROGRESS NOTES
Daily Note     Today's date: 8/10/2021  Patient name: Royal Levy  : 1985  MRN: [id number]  Referring provider: Celestine Clark MD  Dx:   Encounter Diagnosis     ICD-10-CM    1  S/P lumbar discectomy  Z98 890    2  Chronic bilateral low back pain with right-sided sciatica  M54 41     G89 29                   Subjective: Pt reports he has been feeling pretty good since the evaluation, and that the stretches he was given have been helping  Objective: See treatment diary below      Assessment: Pt gained extension and SB lumbar motion following mobilizations, and he reported feeling more loose  Pt was almost able to achieve full lock out with prone press ups  Pt had pain with bridges that was mostly relieved following cueing for TrA and glute contraction  Pt was given an updated HEP working on lumbar and core strengthening  Patient demonstrated fatigue post treatment, exhibited good technique with therapeutic exercises and would benefit from continued PT      Plan: Continue per plan of care        Precautions: lumbar discectomy       8/6 8/10           Manuals             Lumbar P-A mobs  EM Gr  II-III                                                  Neuro Re-Ed             Sciatic nerve glide x15 HEP            TrA contraction  10x5"           Bridges + TrA  2x10 3" HEP           Multifidus isometric                                                    Ther Ex             Prone quad stretch 4x30" HEP            Prone on elbows 2x1'            Prone press ups 2x10 HEP 3x10           pallof press  2x15 BTB HEP           bike  5'           SL straight arm pulldown  2x15 BTB HEP                                     Ther Activity                                       Gait Training                                       Modalities                                       Assessment IE, POC, Prognosis            Education HEP, POC, Prognosis Ilya Weiss

## 2021-08-20 ENCOUNTER — APPOINTMENT (OUTPATIENT)
Dept: PHYSICAL THERAPY | Facility: CLINIC | Age: 36
End: 2021-08-20
Payer: OTHER MISCELLANEOUS

## 2021-09-03 ENCOUNTER — OFFICE VISIT (OUTPATIENT)
Dept: PHYSICAL THERAPY | Facility: CLINIC | Age: 36
End: 2021-09-03
Payer: OTHER MISCELLANEOUS

## 2021-09-03 DIAGNOSIS — Z98.890 S/P LUMBAR DISCECTOMY: Primary | ICD-10-CM

## 2021-09-03 DIAGNOSIS — G89.29 CHRONIC BILATERAL LOW BACK PAIN WITH RIGHT-SIDED SCIATICA: ICD-10-CM

## 2021-09-03 DIAGNOSIS — M54.41 CHRONIC BILATERAL LOW BACK PAIN WITH RIGHT-SIDED SCIATICA: ICD-10-CM

## 2021-09-03 PROCEDURE — 97140 MANUAL THERAPY 1/> REGIONS: CPT | Performed by: PHYSICAL THERAPIST

## 2021-09-03 PROCEDURE — 97110 THERAPEUTIC EXERCISES: CPT | Performed by: PHYSICAL THERAPIST

## 2021-09-03 NOTE — PROGRESS NOTES
Daily Note     Today's date: 9/3/2021  Patient name: Royal Levy  : 1985  MRN: [id number]  Referring provider: Celestine Clark MD  Dx:   Encounter Diagnosis     ICD-10-CM    1  S/P lumbar discectomy  Z98 890    2  Chronic bilateral low back pain with right-sided sciatica  M54 41     G89 29                   Subjective: The patient states that he is feeling okay today  Some complaints of pain and discomfort in his back  Objective: See treatment diary below      Assessment: Today is patient's first visit since 8/10/21 secondary to insurance issues  He had fair tolerance to TE as outlined below in daily treatment diary  He demonstrates weakness t/o his core and hip musculature  Tenderness and muscle tightness is noted along lumber PVMs  Pain level remains the same to end  Continued PT would be beneficial to improve function  Plan: Continue per plan of care        Precautions: lumbar discectomy       8/6 8/10 9/3          Manuals             Lumbar P-A mobs  EM Gr  II-III ML Gr II-III                                                 Neuro Re-Ed             Sciatic nerve glide x15 HEP            TrA contraction  10x5" 5"x10          Bridges + TrA  2x10 3" HEP 3"   2x10          Multifidus isometric                                                    Ther Ex             Prone quad stretch 4x30" HEP            Prone on elbows 2x1'            Prone press ups 2x10 HEP 3x10 3x10          pallof press  2x15 BTB HEP BTB   2x15 Brandon          bike  5' 6'          SL straight arm pulldown  2x15 BTB HEP BTB   2x15          Rows w/TBand   BTB 2x15                       Ther Activity                                       Gait Training                                       Modalities                                       Assessment IE, POC, Prognosis            Education HEP, POC, Prognosis Ilya Weiss

## 2021-09-07 ENCOUNTER — OFFICE VISIT (OUTPATIENT)
Dept: PHYSICAL THERAPY | Facility: CLINIC | Age: 36
End: 2021-09-07
Payer: OTHER MISCELLANEOUS

## 2021-09-07 DIAGNOSIS — G89.29 CHRONIC BILATERAL LOW BACK PAIN WITH RIGHT-SIDED SCIATICA: ICD-10-CM

## 2021-09-07 DIAGNOSIS — Z98.890 S/P LUMBAR DISCECTOMY: Primary | ICD-10-CM

## 2021-09-07 DIAGNOSIS — M54.41 CHRONIC BILATERAL LOW BACK PAIN WITH RIGHT-SIDED SCIATICA: ICD-10-CM

## 2021-09-07 PROCEDURE — 97110 THERAPEUTIC EXERCISES: CPT

## 2021-09-07 PROCEDURE — 97140 MANUAL THERAPY 1/> REGIONS: CPT

## 2021-09-07 PROCEDURE — 97112 NEUROMUSCULAR REEDUCATION: CPT

## 2021-09-07 NOTE — PROGRESS NOTES
Daily Note     Today's date: 2021  Patient name: Darius De La Torre  : 1985  MRN: [id number]  Referring provider: April New MD   Dx:   Encounter Diagnosis     ICD-10-CM    1  S/P lumbar discectomy  Z98 890    2  Chronic bilateral low back pain with right-sided sciatica  M54 41     G89 29                   Subjective: Pt reports that he has been doing his exercises and is feeling more loose however is still having some pain  Pt reports that he has noticed that both his and his fiance have had a "deep itch" following their injections in August of last year  Objective: See treatment diary below      Assessment: Pt had a lot of difficulty isolating a glute contraction without activating his lower lumbar spine musculature, and required extensive tactile and verbal cueing  When performing glute activation properly he had no pain with prone SLR  Pt demonstrates improvement with prone press up motion getting to near lock out  Patient demonstrated fatigue post treatment, exhibited good technique with therapeutic exercises and would benefit from continued PT      Plan: Continue per plan of care        Precautions: lumbar discectomy       8/6 8/10 9/3 9/7         Manuals             Lumbar P-A mobs  EM Gr  II-III ML Gr II-III EM Gr  II-III, UPA/CPA         STM/TPR L lower lumbar    EM                                    Neuro Re-Ed             Sciatic nerve glide x15 HEP            TrA contraction  10x5" 5"x10 10x5"         Bridges + TrA  2x10 3" HEP 3"   2x10 3" 2x10         Multifidus isometric             glute sets    Prone U/L 20x5" ea HEP         Prone SLR w/ unilateral glute set    2x10                      Ther Ex             Prone quad stretch 4x30" HEP            Prone on elbows 2x1'            Prone press ups 2x10 HEP 3x10 3x10 3x10         pallof press  2x15 BTB HEP BTB   2x15 Brandon BTB 2x20         bike  5' 6' 5'         SL straight arm pulldown  2x15 BTB HEP BTB   2x15 BTB 2x20         Rows w/TBand BTB 2x15 BTB 2x20         treadmill    5' self paced         Ther Activity                                       Gait Training                                       Modalities                                       Assessment IE, POC, Prognosis            Education HEP, POC, Prognosis [facility]

## 2021-09-13 ENCOUNTER — APPOINTMENT (OUTPATIENT)
Dept: PHYSICAL THERAPY | Facility: CLINIC | Age: 36
End: 2021-09-13
Payer: OTHER MISCELLANEOUS

## 2021-09-14 ENCOUNTER — APPOINTMENT (OUTPATIENT)
Dept: PHYSICAL THERAPY | Facility: CLINIC | Age: 36
End: 2021-09-14
Payer: OTHER MISCELLANEOUS

## 2021-09-20 ENCOUNTER — OFFICE VISIT (OUTPATIENT)
Dept: PHYSICAL THERAPY | Facility: CLINIC | Age: 36
End: 2021-09-20
Payer: OTHER MISCELLANEOUS

## 2021-09-20 DIAGNOSIS — Z98.890 S/P LUMBAR DISCECTOMY: Primary | ICD-10-CM

## 2021-09-20 DIAGNOSIS — M54.41 CHRONIC BILATERAL LOW BACK PAIN WITH RIGHT-SIDED SCIATICA: ICD-10-CM

## 2021-09-20 DIAGNOSIS — G89.29 CHRONIC BILATERAL LOW BACK PAIN WITH RIGHT-SIDED SCIATICA: ICD-10-CM

## 2021-09-20 PROCEDURE — 97112 NEUROMUSCULAR REEDUCATION: CPT

## 2021-09-20 PROCEDURE — 97110 THERAPEUTIC EXERCISES: CPT

## 2021-09-20 NOTE — PROGRESS NOTES
Daily Note     Today's date: 2021  Patient name: Michelle Thacker  : 1985  MRN: [id number]  Referring provider: Alicia George MD   Dx:   Encounter Diagnosis     ICD-10-CM    1  S/P lumbar discectomy  Z98 [id number]    2  Chronic bilateral low back pain with right-sided sciatica  M54 41     G89 29                   Subjective: Pt reports he was unable to make his PT appointments last week due to the itch/pain in his upper back getting worse  Pt reports his back scratcher helps however it doesn't go away  Pt is seeing his PCP Wednesday to see what options he has  Pt reports his low back pain is getting a little better  Objective: See treatment diary below      Assessment: Pt demonstrated improved extension motion in both standing and during prone press ups, and more so following mobilizations  Pt did well with squats and bird dogs having only minimal increase in low back pain during the exercise  Patient demonstrated fatigue post treatment, exhibited good technique with therapeutic exercises and would benefit from continued PT      Plan: Continue per plan of care  Precautions: lumbar discectomy       8/6 8/10 9/3 9/7 9/20        Manuals             Lumbar P-A mobs  EM Gr  II-III ML Gr II-III EM Gr  II-III, UPA/CPA EM Gr  III        STM/TPR L lower lumbar    EM                                    Neuro Re-Ed             Sciatic nerve glide x15 HEP            TrA contraction  10x5" 5"x10 10x5"         Bridges + TrA  2x10 3" HEP 3"   2x10 3" 2x10         Multifidus isometric             glute sets    Prone U/L 20x5" ea HEP         Prone SLR w/ unilateral glute set    2x10 2x10 ea   10x knee bent        Bird dogs     Single extremity 5x ea        Ther Ex             Prone quad stretch 4x30" HEP            Prone on elbows 2x1'            Prone press ups 2x10 HEP 3x10 3x10 3x10 2x10        pallof press  2x15 BTB HEP BTB   2x15 Brandon BTB 2x20 On Pball 2x20        bike  5' 6' 5' 5'        SL straight arm pulldown 2x15 BTB HEP BTB   2x15 BTB 2x20         Rows w/TBand   BTB 2x15 BTB 2x20         treadmill    5' self paced         squats     2x10 w TrA        Hip abduction/extension     2x10 ea YTB                                                Ther Activity                                       Gait Training                                       Modalities                                       Assessment IE, POC, Prognosis            Education HEP, POC, Prognosis Flako More

## 2021-09-24 ENCOUNTER — APPOINTMENT (OUTPATIENT)
Dept: PHYSICAL THERAPY | Facility: CLINIC | Age: 36
End: 2021-09-24
Payer: OTHER MISCELLANEOUS

## 2021-09-27 ENCOUNTER — APPOINTMENT (OUTPATIENT)
Dept: PHYSICAL THERAPY | Facility: CLINIC | Age: 36
End: 2021-09-27
Payer: OTHER MISCELLANEOUS

## 2021-10-01 ENCOUNTER — OFFICE VISIT (OUTPATIENT)
Dept: PHYSICAL THERAPY | Facility: CLINIC | Age: 36
End: 2021-10-01
Payer: OTHER MISCELLANEOUS

## 2021-10-01 DIAGNOSIS — G89.29 CHRONIC BILATERAL LOW BACK PAIN WITH RIGHT-SIDED SCIATICA: ICD-10-CM

## 2021-10-01 DIAGNOSIS — M54.41 CHRONIC BILATERAL LOW BACK PAIN WITH RIGHT-SIDED SCIATICA: ICD-10-CM

## 2021-10-01 DIAGNOSIS — Z98.890 S/P LUMBAR DISCECTOMY: Primary | ICD-10-CM

## 2021-10-04 ENCOUNTER — EVALUATION (OUTPATIENT)
Dept: PHYSICAL THERAPY | Facility: CLINIC | Age: 36
End: 2021-10-04
Payer: OTHER MISCELLANEOUS

## 2021-10-04 DIAGNOSIS — M54.41 CHRONIC BILATERAL LOW BACK PAIN WITH RIGHT-SIDED SCIATICA: ICD-10-CM

## 2021-10-04 DIAGNOSIS — Z98.890 S/P LUMBAR DISCECTOMY: Primary | ICD-10-CM

## 2021-10-04 DIAGNOSIS — G89.29 CHRONIC BILATERAL LOW BACK PAIN WITH RIGHT-SIDED SCIATICA: ICD-10-CM

## 2021-10-04 PROCEDURE — 97110 THERAPEUTIC EXERCISES: CPT

## 2021-10-11 ENCOUNTER — APPOINTMENT (OUTPATIENT)
Dept: PHYSICAL THERAPY | Facility: CLINIC | Age: 36
End: 2021-10-11
Payer: OTHER MISCELLANEOUS

## 2021-10-14 ENCOUNTER — OFFICE VISIT (OUTPATIENT)
Dept: PHYSICAL THERAPY | Facility: CLINIC | Age: 36
End: 2021-10-14
Payer: OTHER MISCELLANEOUS

## 2021-10-14 DIAGNOSIS — M54.41 CHRONIC BILATERAL LOW BACK PAIN WITH RIGHT-SIDED SCIATICA: ICD-10-CM

## 2021-10-14 DIAGNOSIS — Z98.890 S/P LUMBAR DISCECTOMY: Primary | ICD-10-CM

## 2021-10-14 DIAGNOSIS — G89.29 CHRONIC BILATERAL LOW BACK PAIN WITH RIGHT-SIDED SCIATICA: ICD-10-CM

## 2021-10-14 PROCEDURE — 97110 THERAPEUTIC EXERCISES: CPT

## 2021-10-14 PROCEDURE — 97530 THERAPEUTIC ACTIVITIES: CPT

## 2021-10-14 PROCEDURE — 97112 NEUROMUSCULAR REEDUCATION: CPT

## 2021-10-18 ENCOUNTER — APPOINTMENT (OUTPATIENT)
Dept: PHYSICAL THERAPY | Facility: CLINIC | Age: 36
End: 2021-10-18
Payer: OTHER MISCELLANEOUS

## 2021-12-27 ENCOUNTER — OFFICE VISIT (OUTPATIENT)
Dept: FAMILY MEDICINE CLINIC | Facility: CLINIC | Age: 36
End: 2021-12-27
Payer: COMMERCIAL

## 2021-12-27 VITALS
WEIGHT: 251.6 LBS | HEART RATE: 80 BPM | BODY MASS INDEX: 35.22 KG/M2 | SYSTOLIC BLOOD PRESSURE: 140 MMHG | OXYGEN SATURATION: 98 % | TEMPERATURE: 97.3 F | DIASTOLIC BLOOD PRESSURE: 90 MMHG | HEIGHT: 71 IN

## 2021-12-27 DIAGNOSIS — Z86.010 HISTORY OF COLON POLYPS: ICD-10-CM

## 2021-12-27 DIAGNOSIS — Z76.89 ENCOUNTER TO ESTABLISH CARE WITH NEW DOCTOR: ICD-10-CM

## 2021-12-27 DIAGNOSIS — R94.31 ABNORMAL EKG: ICD-10-CM

## 2021-12-27 DIAGNOSIS — R07.9 CHEST PAIN, UNSPECIFIED TYPE: Primary | ICD-10-CM

## 2021-12-27 DIAGNOSIS — D24.2 FIBROADENOMA OF BREAST, LEFT: ICD-10-CM

## 2021-12-27 PROBLEM — Z86.0100 HISTORY OF COLON POLYPS: Status: ACTIVE | Noted: 2021-12-27

## 2021-12-27 PROCEDURE — 99204 OFFICE O/P NEW MOD 45 MIN: CPT | Performed by: STUDENT IN AN ORGANIZED HEALTH CARE EDUCATION/TRAINING PROGRAM

## 2021-12-27 PROCEDURE — 93000 ELECTROCARDIOGRAM COMPLETE: CPT | Performed by: STUDENT IN AN ORGANIZED HEALTH CARE EDUCATION/TRAINING PROGRAM

## 2022-01-13 ENCOUNTER — CONSULT (OUTPATIENT)
Dept: CARDIOLOGY CLINIC | Facility: CLINIC | Age: 37
End: 2022-01-13
Payer: COMMERCIAL

## 2022-01-13 VITALS
HEIGHT: 71 IN | RESPIRATION RATE: 16 BRPM | DIASTOLIC BLOOD PRESSURE: 90 MMHG | WEIGHT: 248 LBS | BODY MASS INDEX: 34.72 KG/M2 | HEART RATE: 73 BPM | SYSTOLIC BLOOD PRESSURE: 130 MMHG | OXYGEN SATURATION: 99 %

## 2022-01-13 DIAGNOSIS — R94.31 ABNORMAL EKG: ICD-10-CM

## 2022-01-13 DIAGNOSIS — E78.2 MIXED HYPERLIPIDEMIA: ICD-10-CM

## 2022-01-13 DIAGNOSIS — R07.2 PRECORDIAL CHEST PAIN: Primary | ICD-10-CM

## 2022-01-13 PROCEDURE — 99244 OFF/OP CNSLTJ NEW/EST MOD 40: CPT | Performed by: INTERNAL MEDICINE

## 2022-01-13 PROCEDURE — 93000 ELECTROCARDIOGRAM COMPLETE: CPT | Performed by: INTERNAL MEDICINE

## 2022-01-13 NOTE — PROGRESS NOTES
Cardiology Consultation     Ely Cerrato  [id number]  1985  [address] [facility] [id number]    1  Precordial chest pain  Ambulatory referral to Cardiology    Echo complete w/ contrast if indicated    NM myocardial perfusion spect (stress and/or rest)    POCT ECG   2  Abnormal EKG  Ambulatory referral to Cardiology    Echo complete w/ contrast if indicated    NM myocardial perfusion spect (stress and/or rest)    POCT ECG   3  Mixed hyperlipidemia  Echo complete w/ contrast if indicated    NM myocardial perfusion spect (stress and/or rest)   4  BMI 34 0-34 9,adult         Chief Complaint:  Chest pain    HPI:  69-year-old male with hyperlipidemia was referred for evaluation of chest pain    Patient is experience sharp left precordial chest pain lasting few minutes without any associated symptoms  No aggravating or relieving factor  Patient experienc chest pain mostly at rest very seldom during exertion which is going on for last 1-2 years  He experiences pain 1 to 2 times a month  He denies shortness of breath, palpitation, dizziness, orthopnea, leg edema or loss of consciousness    He currently works out at [employer] does strengthening exercise including cardio twice a week without any reproducibility of chest pain or shortness of breath  Patient drinks coffee almost 4 times a day  Was advised to cut back on caffeine intake      Denies personal history of myocardial infarction, TIA/CVA, heart failure, cardiac arrhythmia or peripheral vascular disease    Social History:  Ex-smoker quit few years back used to smoke on and off 2 cigarettes for  10 years, social alcohol intake, denies illicit drug use  Family History:  No family history of coronary artery disease    As per patient his blood pressure is always noted to be high as he had caffeine intake but otherwise it is normal  Never had cardiac stress test or echocardiogram    Current medications reviewed  Labs from 2021 reviewed in Care everywhere  TSH within normal limits  H&H within normal limits  Slightly elevated AST ALT, normal creatinine  , total cholesterol 147, HDL 27, triglyceride 45  Repeat labs ordered by PCP recently    Review of Systems: All review of system negative except as mentioned above    Patient Active Problem List   Diagnosis    Webbed penis    Chest pain    BMI 35 0-35 9,adult    Fibroadenoma of breast, left    History of colon polyps     History reviewed  No pertinent past medical history    Social History     Socioeconomic History    Marital status: Single     Spouse name: Not on file    Number of children: Not on file    Years of education: Not on file    Highest education level: Not on file   Occupational History    Not on file   Tobacco Use    Smoking status: Former Smoker     Packs/day: 0 25     Quit date: 2021     Years since quittin 7    Smokeless tobacco: Never Used   Vaping Use    Vaping Use: Never used   Substance and Sexual Activity    Alcohol use: Yes     Comment: social 3x a year    Drug use: Never    Sexual activity: Not on file   Other Topics Concern    Not on file   Social History Narrative    Not on file     Social Determinants of Health     Financial Resource Strain: Not on file   Food Insecurity: Not on file   Transportation Needs: Not on file   Physical Activity: Not on file   Stress: Not on file   Social Connections: Not on file   Intimate Partner Violence: Not on file   Housing Stability: Not on file      Family History   Problem Relation Age of Onset    Heart disease Mother     No Known Problems Father     No Known Problems Son     No Known Problems Daughter     No Known Problems Daughter     No Known Problems Paternal Grandmother     Colon cancer Paternal Grandfather      Past Surgical History:   Procedure Laterality Date    GA REVISION OF Marylen Kill N/A 7/1/2021    Procedure: SCROTOPLASTY;  Surgeon: Brandee Johnson MD;  Location: [facility];  Service: Urology    VASECTOMY      VASECTOMY  2016       Current Outpatient Medications:     acetaminophen (TYLENOL) 325 mg tablet, Take 2 tablets (650 mg total) by mouth every 4 (four) hours as needed for mild pain, Disp: 30 tablet, Rfl: 0    b complex vitamins tablet, Take 1 tablet by mouth daily, Disp: , Rfl:     multivitamin (THERAGRAN) TABS, Take 1 tablet by mouth daily, Disp: , Rfl:   No Known Allergies  Vitals:    01/13/22 0856   BP: 130/90   BP Location: Left arm   Patient Position: Sitting   Cuff Size: Large   Pulse: 73   Resp: 16   SpO2: 99%   Weight: 112 kg (248 lb)   Height: 5' 11" (1 803 m)         Labs:  No visits with results within 6 Month(s) from this visit  Latest known visit with results is:   Admission on 07/01/2021, Discharged on 07/01/2021   Component Date Value    Case Report 07/01/2021                      Value:Surgical Pathology Report                         Case: [id number]                                   Authorizing Provider:  Brandee Johnson MD    Collected:           07/01/2021 1434              Ordering Location:     [facility] Received:            07/01/2021 1545                                     Operating Room                                                               Pathologist:           Junior Martins MD                                                        Specimen:    Teralainey Green SKIN                                                                     Final Diagnosis 07/01/2021                      Value: This result contains rich text formatting which cannot be displayed here   Additional Information 07/01/2021                      Value: This result contains rich text formatting which cannot be displayed here  Mikaela Marquis Gross Description 07/01/2021                      Value: This result contains rich text formatting which cannot be displayed here   Clinical Information 07/01/2021                      Value:SCROTAL SKIN     Imaging: No results found  Physical Exam:  General:  moderate built, awake, alert and oriented x3, not in distress  Neck: supple, no JVD  Eyes: PERRL, conjunctiva normal  Lungs:  Bilateral air entry positive, no wheeze/rhonchi or crackle  Heart:  S1-S2 normal, no murmur  Abdomen:  Soft ,nondistended ,nontender, bowel sounds positive  Extremities:  No leg edema, no deformity, ROM normal  Neuro:  Moving all extremities, speech clear  Skin: warm, no rash    /90 (BP Location: Left arm, Patient Position: Sitting, Cuff Size: Large)   Pulse 73   Resp 16   Ht 5' 11" (1 803 m)   Wt 112 kg (248 lb)   SpO2 99%   BMI 34 59 kg/m²       Cardiographics :  ECG:  EKG today showed sinus rhythm, possible left atrial enlargement, nonspecific ST changes in inferior leads  (EKG done in December 2021 reviewed and no significant changes since then and ST changes in lead AVF is better)      Assessment:    1  Chest pain  Low to intermediate probability of angina    2  Hyperlipidemia   currently diet controlled  3  History of elevated LFT repeat labs are pending  4  BMI 34    Recommendations:    2D echocardiogram to evaluate for structural heart disease  Patient with above-mentioned complaints and cardiac risk factors with abnormal EKG would benefit from further evaluation of coronary artery disease  Exercise MPI stress test for further evaluation    Advised to monitor blood pressure at home and give us a call if it stays persistently more than 130/80  Advised to cut back on caffeine intake    Patient was educated about cardiac symptoms to watch out for and call 911 or go to nearest ED as needed  Return to clinic in 6 months or early as needed  Above all discussed with patient    Patient understands and agrees

## 2022-02-24 ENCOUNTER — HOSPITAL ENCOUNTER (OUTPATIENT)
Dept: NON INVASIVE DIAGNOSTICS | Facility: CLINIC | Age: 37
Discharge: HOME | End: 2022-02-24
Payer: COMMERCIAL

## 2022-02-24 VITALS
SYSTOLIC BLOOD PRESSURE: 130 MMHG | WEIGHT: 248 LBS | HEIGHT: 71 IN | HEART RATE: 73 BPM | BODY MASS INDEX: 34.72 KG/M2 | DIASTOLIC BLOOD PRESSURE: 90 MMHG

## 2022-02-24 VITALS
OXYGEN SATURATION: 98 % | SYSTOLIC BLOOD PRESSURE: 146 MMHG | HEART RATE: 57 BPM | HEIGHT: 71 IN | BODY MASS INDEX: 34.72 KG/M2 | WEIGHT: 248 LBS | DIASTOLIC BLOOD PRESSURE: 82 MMHG

## 2022-02-24 DIAGNOSIS — R94.31 ABNORMAL EKG: ICD-10-CM

## 2022-02-24 DIAGNOSIS — E78.2 MIXED HYPERLIPIDEMIA: ICD-10-CM

## 2022-02-24 DIAGNOSIS — R07.2 PRECORDIAL CHEST PAIN: ICD-10-CM

## 2022-02-24 LAB
AORTIC ROOT: 3.4 CM
APICAL FOUR CHAMBER EJECTION FRACTION: 58 %
ASCENDING AORTA: 3.1 CM (ref 2.22–3.33)
AV LVOT PEAK GRADIENT: 2 MMHG
AV PEAK GRADIENT: 7 MMHG
E WAVE DECELERATION TIME: 174 MS
FRACTIONAL SHORTENING: 30 % (ref 28–44)
INTERVENTRICULAR SEPTUM IN DIASTOLE (PARASTERNAL SHORT AXIS VIEW): 1.3 CM (ref 0.57–1.08)
INTERVENTRICULAR SEPTUM: 1.3 CM (ref 0.6–1.1)
LAAS-AP2: 14.6 CM2
LAAS-AP4: 19.4 CM2
LEFT ATRIUM AREA SYSTOLE SINGLE PLANE A4C: 19 CM2
LEFT ATRIUM SIZE: 3.4 CM
LEFT INTERNAL DIMENSION IN SYSTOLE: 3.9 CM (ref 5.43–8.24)
LEFT VENTRICULAR INTERNAL DIMENSION IN DIASTOLE: 5.6 CM (ref 9.16–13.65)
LEFT VENTRICULAR POSTERIOR WALL IN END DIASTOLE: 1.3 CM (ref 0.56–1.06)
LEFT VENTRICULAR STROKE VOLUME: 87 ML
LVSV (TEICH): 87 ML
MAX HR PERCENT: 91 %
MV E'TISSUE VEL-SEP: 11 CM/S
MV PEAK A VEL: 1.01 M/S
MV PEAK E VEL: 78 CM/S
MV STENOSIS PRESSURE HALF TIME: 50 MS
MV VALVE AREA P 1/2 METHOD: 4.4 CM2
NUC STRESS DIASTOLIC VOLUME INDEX: 180 ML/M2
NUC STRESS EJECTION FRACTION: 48 %
NUC STRESS SYSTOLIC VOLUME INDEX: 94 ML/M2
RATE PRESSURE PRODUCT: NORMAL
RIGHT ATRIAL 2D VOLUME: 52 ML
RIGHT ATRIUM AREA SYSTOLE A4C: 18 CM2
RIGHT VENTRICLE ID DIMENSION: 4.8 CM
SL CV LEFT ATRIUM LENGTH A2C: 4.7 CM
SL CV LV EF: 57
SL CV PED ECHO LEFT VENTRICLE DIASTOLIC VOLUME (MOD BIPLANE) 2D: 154 ML
SL CV PED ECHO LEFT VENTRICLE SYSTOLIC VOLUME (MOD BIPLANE) 2D: 67 ML
SL CV REST NUCLEAR ISOTOPE DOSE: 15.62 MCI
SL CV STRESS NUCLEAR ISOTOPE DOSE: 48.2 MCI
SL CV STRESS RECOVERY BP: NORMAL MMHG
SL CV STRESS RECOVERY HR: 103 BPM
SL CV STRESS RECOVERY O2 SAT: 98 %
SL CV STRESS STAGE REACHED: 4
STRESS ANGINA INDEX: 0
STRESS BASELINE BP: NORMAL MMHG
STRESS BASELINE HR: 57 BPM
STRESS O2 SAT REST: 98 %
STRESS PEAK HR: 166 BPM
STRESS PERCENT HR: 91 %
STRESS POST ESTIMATED WORKLOAD: 13.4 METS
STRESS POST EXERCISE DUR MIN: 12 MIN
STRESS POST EXERCISE DUR SEC: 0 SEC
STRESS POST O2 SAT PEAK: 96 %
STRESS POST PEAK BP: 220 MMHG
STRESS TARGET HR: 166 BPM
STRESS/REST PERFUSION RATIO: 0.93
Z-SCORE OF ASCENDING AORTA: 1.15
Z-SCORE OF INTERVENTRICULAR SEPTUM IN END DIASTOLE: 3.72
Z-SCORE OF LEFT VENTRICULAR DIMENSION IN END DIASTOLE: -6.92
Z-SCORE OF LEFT VENTRICULAR DIMENSION IN END SYSTOLE: -4.27
Z-SCORE OF LEFT VENTRICULAR POSTERIOR WALL IN END DIASTOLE: 3.83

## 2022-02-24 PROCEDURE — 93306 TTE W/DOPPLER COMPLETE: CPT

## 2022-02-24 PROCEDURE — 78452 HT MUSCLE IMAGE SPECT MULT: CPT | Performed by: INTERNAL MEDICINE

## 2022-02-24 PROCEDURE — 93306 TTE W/DOPPLER COMPLETE: CPT | Performed by: INTERNAL MEDICINE

## 2022-02-24 PROCEDURE — 78452 HT MUSCLE IMAGE SPECT MULT: CPT

## 2022-02-24 PROCEDURE — 93016 CV STRESS TEST SUPVJ ONLY: CPT | Performed by: INTERNAL MEDICINE

## 2022-02-24 PROCEDURE — A9502 TC99M TETROFOSMIN: HCPCS

## 2022-02-24 PROCEDURE — G1004 CDSM NDSC: HCPCS

## 2022-02-24 PROCEDURE — 93017 CV STRESS TEST TRACING ONLY: CPT

## 2022-02-24 PROCEDURE — 93018 CV STRESS TEST I&R ONLY: CPT | Performed by: INTERNAL MEDICINE

## 2022-03-11 ENCOUNTER — OFFICE VISIT (OUTPATIENT)
Dept: FAMILY MEDICINE CLINIC | Facility: CLINIC | Age: 37
End: 2022-03-11
Payer: COMMERCIAL

## 2022-03-11 VITALS
BODY MASS INDEX: 36.06 KG/M2 | HEIGHT: 71 IN | DIASTOLIC BLOOD PRESSURE: 82 MMHG | HEART RATE: 95 BPM | TEMPERATURE: 97.5 F | OXYGEN SATURATION: 97 % | WEIGHT: 257.6 LBS | SYSTOLIC BLOOD PRESSURE: 132 MMHG

## 2022-03-11 DIAGNOSIS — R07.9 CHEST PAIN, UNSPECIFIED TYPE: ICD-10-CM

## 2022-03-11 DIAGNOSIS — Z86.010 HISTORY OF COLON POLYPS: ICD-10-CM

## 2022-03-11 DIAGNOSIS — Z00.00 ANNUAL PHYSICAL EXAM: Primary | ICD-10-CM

## 2022-03-11 DIAGNOSIS — R03.0 ELEVATED BLOOD PRESSURE READING: ICD-10-CM

## 2022-03-11 PROBLEM — Q55.69 WEBBED PENIS: Status: RESOLVED | Noted: 2021-07-08 | Resolved: 2022-03-11

## 2022-03-11 PROCEDURE — 99395 PREV VISIT EST AGE 18-39: CPT | Performed by: STUDENT IN AN ORGANIZED HEALTH CARE EDUCATION/TRAINING PROGRAM

## 2022-03-11 NOTE — PROGRESS NOTES
[address]    NAME: Ely Blossom  AGE: 39 y o  SEX: male  : 1985     DATE: 3/11/2022     Assessment and Plan:     Problem List Items Addressed This Visit        Other    Chest pain     Has since resolved, nuclear stress test/echo normal          BMI 35 0-35 9,adult     Diet and exercise discussed          History of colon polyps     Had colonoscopy in 2019 with Dr Lico Palumbo         Elevated blood pressure reading     Goal is to be <130/80  Just above this, discussed diet changes            Other Visit Diagnoses     Annual physical exam    -  Primary          Immunizations and preventive care screenings were discussed with patient today  Appropriate education was printed on patient's after visit summary  Counseling:  · Exercise: the importance of regular exercise/physical activity was discussed  Recommend exercise 3-5 times per week for at least 30 minutes  Return in about 6 months (around 2022) for Recheck HTN  Chief Complaint:     Chief Complaint   Patient presents with    Annual Exam     No concerns       History of Present Illness:     Adult Annual Physical   Patient here for a comprehensive physical exam  The patient reports no problems  Diet and Physical Activity  · Diet/Nutrition: well balanced diet  · Exercise: moderate cardiovascular exercise, strength training exercises and 3-4 times a week on average  Depression Screening  PHQ-2/9 Depression Screening    Little interest or pleasure in doing things: 0 - not at all  Feeling down, depressed, or hopeless: 0 - not at all  PHQ-2 Score: 0  PHQ-2 Interpretation: Negative depression screen       General Health  · Sleep: sleeps well  · Hearing: normal - bilateral   · Vision: no vision problems and goes for regular eye exams  · Dental: regular dental visits          Health  · History of STDs?: no      Review of Systems:     Review of Systems   Constitutional: Negative for chills, fatigue and fever  HENT: Negative for rhinorrhea and sore throat  Eyes: Negative for visual disturbance  Respiratory: Negative for cough and shortness of breath  Cardiovascular: Negative for chest pain and palpitations  Gastrointestinal: Negative for abdominal pain, constipation, diarrhea, nausea and vomiting  Genitourinary: Negative for difficulty urinating, dysuria and frequency  Musculoskeletal: Negative for arthralgias and myalgias  Skin: Negative for color change and rash  Neurological: Negative for weakness and headaches        Past Medical History:     Past Medical History:   Diagnosis Date    Webbed penis 2021      Past Surgical History:     Past Surgical History:   Procedure Laterality Date    CT REVISION OF Felixad N/A 2021    Procedure: Harden Endow;  Surgeon: Krzysztof Richmond MD;  Location: [facility];  Service: Urology    VASECTOMY      VASECTOMY        Social History:     Social History     Socioeconomic History    Marital status: Single     Spouse name: None    Number of children: None    Years of education: None    Highest education level: None   Occupational History    None   Tobacco Use    Smoking status: Former Smoker     Packs/day: 0 25     Quit date: 2021     Years since quittin 9    Smokeless tobacco: Never Used   Vaping Use    Vaping Use: Never used   Substance and Sexual Activity    Alcohol use: Yes     Comment: social 3x a year    Drug use: Never    Sexual activity: None   Other Topics Concern    None   Social History Narrative    None     Social Determinants of Health     Financial Resource Strain: Not on file   Food Insecurity: Not on file   Transportation Needs: Not on file   Physical Activity: Not on file   Stress: Not on file   Social Connections: Not on file   Intimate Partner Violence: Not on file   Housing Stability: Not on file      Family History: Family History   Problem Relation Age of Onset    Heart disease Mother     No Known Problems Father     No Known Problems Son     No Known Problems Daughter     No Known Problems Daughter     No Known Problems Paternal Grandmother     Colon cancer Paternal Grandfather       Current Medications:     Current Outpatient Medications   Medication Sig Dispense Refill    acetaminophen (TYLENOL) 325 mg tablet Take 2 tablets (650 mg total) by mouth every 4 (four) hours as needed for mild pain 30 tablet 0    b complex vitamins tablet Take 1 tablet by mouth daily      multivitamin (THERAGRAN) TABS Take 1 tablet by mouth daily       No current facility-administered medications for this visit  Allergies:     No Known Allergies   Physical Exam:     /82 (BP Location: Left arm, Patient Position: Sitting, Cuff Size: Standard)   Pulse 95   Temp 97 5 °F (36 4 °C)   Ht 5' 11" (1 803 m)   Wt 117 kg (257 lb 9 6 oz)   SpO2 97%   BMI 35 93 kg/m²     Physical Exam  Constitutional:       General: He is not in acute distress  Appearance: Normal appearance  He is not ill-appearing  HENT:      Head: Normocephalic and atraumatic  Right Ear: Tympanic membrane, ear canal and external ear normal       Left Ear: Tympanic membrane, ear canal and external ear normal       Nose: Nose normal       Mouth/Throat:      Mouth: Mucous membranes are moist       Pharynx: Oropharynx is clear  No oropharyngeal exudate or posterior oropharyngeal erythema  Eyes:      General: No scleral icterus  Right eye: No discharge  Left eye: No discharge  Extraocular Movements: Extraocular movements intact  Conjunctiva/sclera: Conjunctivae normal       Pupils: Pupils are equal, round, and reactive to light  Cardiovascular:      Rate and Rhythm: Normal rate and regular rhythm  Pulses: Normal pulses  Heart sounds: Normal heart sounds  No murmur heard        Pulmonary:      Effort: Pulmonary effort is normal  No respiratory distress  Breath sounds: Normal breath sounds  Abdominal:      General: Bowel sounds are normal       Palpations: Abdomen is soft  Tenderness: There is no abdominal tenderness  Musculoskeletal:         General: Normal range of motion  Cervical back: Normal range of motion and neck supple  Lymphadenopathy:      Cervical: No cervical adenopathy  Skin:     General: Skin is warm and dry  Capillary Refill: Capillary refill takes less than 2 seconds  Neurological:      General: No focal deficit present  Mental Status: He is alert and oriented to person, place, and time  Mental status is at baseline  Cranial Nerves: No cranial nerve deficit     Psychiatric:         Mood and Affect: Mood normal           MD Darcie Muñoz [phone]

## 2022-03-11 NOTE — PATIENT INSTRUCTIONS
Wellness Visit for Adults   AMBULATORY CARE:   A wellness visit  is when you see your healthcare provider to get screened for health problems  Your healthcare provider will also give you advice on how to stay healthy  Write down your questions so you remember to ask them  Ask your healthcare provider how often you should have a wellness visit  What happens at a wellness visit:  Your healthcare provider will ask about your health, and your family history of health problems  This includes high blood pressure, heart disease, and cancer  He or she will ask if you have symptoms that concern you, if you smoke, and about your mood  You may also be asked about your intake of medicines, supplements, food, and alcohol  Any of the following may be done:  · Your weight  will be checked  Your height may also be checked so your body mass index (BMI) can be calculated  Your BMI shows if you are at a healthy weight  · Your blood pressure  and heart rate will be checked  Your temperature may also be checked  · Blood and urine tests  may be done  Blood tests may be done to check your cholesterol levels  Abnormal cholesterol levels increase your risk for heart disease and stroke  You may also need a blood or urine test to check for diabetes if you are at increased risk  Urine tests may be done to look for signs of an infection or kidney disease  · A physical exam  includes checking your heartbeat and lungs with a stethoscope  Your healthcare provider may also check your skin to look for sun damage  · Screening tests  may be recommended  A screening test is done to check for diseases that may not cause symptoms  The screening tests you may need depend on your age, gender, family history, and lifestyle habits  For example, colorectal screening may be recommended if you are 48years old or older  Screening tests you need if you are a woman:   · A Pap smear  is used to screen for cervical cancer   Pap smears are usually done every 3 to 5 years depending on your age  You may need them more often if you have had abnormal Pap smear test results in the past  Ask your healthcare provider how often you should have a Pap smear  · A mammogram  is an x-ray of your breasts to screen for breast cancer  Experts recommend mammograms every 2 years starting at age 48 years  You may need a mammogram at age 52 years or younger if you have an increased risk for breast cancer  Talk to your healthcare provider about when you should start having mammograms and how often you need them  Vaccines you may need:   · Get an influenza vaccine  every year  The influenza vaccine protects you from the flu  Several types of viruses cause the flu  The viruses change over time, so new vaccines are made each year  · Get a tetanus-diphtheria (Td) booster vaccine  every 10 years  This vaccine protects you against tetanus and diphtheria  Tetanus is a severe infection that may cause painful muscle spasms and lockjaw  Diphtheria is a severe bacterial infection that causes a thick covering in the back of your mouth and throat  · Get a human papillomavirus (HPV) vaccine  if you are female and aged 23 to 32 or male 23 to 24 and never received it  This vaccine protects you from HPV infection  HPV is the most common infection spread by sexual contact  HPV may also cause vaginal, penile, and anal cancers  · Get a pneumococcal vaccine  if you are aged 72 years or older  The pneumococcal vaccine is an injection given to protect you from pneumococcal disease  Pneumococcal disease is an infection caused by pneumococcal bacteria  The infection may cause pneumonia, meningitis, or an ear infection  · Get a shingles vaccine  if you are 60 or older, even if you have had shingles before  The shingles vaccine is an injection to protect you from the varicella-zoster virus  This is the same virus that causes chickenpox   Shingles is a painful rash that develops in people who had chickenpox or have been exposed to the virus  How to eat healthy:  My Plate is a model for planning healthy meals  It shows the types and amounts of foods that should go on your plate  Fruits and vegetables make up about half of your plate, and grains and protein make up the other half  A serving of dairy is included on the side of your plate  The amount of calories and serving sizes you need depends on your age, gender, weight, and height  Examples of healthy foods are listed below:  · Eat a variety of vegetables  such as dark green, red, and orange vegetables  You can also include canned vegetables low in sodium (salt) and frozen vegetables without added butter or sauces  · Eat a variety of fresh fruits , canned fruit in 100% juice, frozen fruit, and dried fruit  · Include whole grains  At least half of the grains you eat should be whole grains  Examples include whole-wheat bread, wheat pasta, brown rice, and whole-grain cereals such as oatmeal     · Eat a variety of protein foods such as seafood (fish and shellfish), lean meat, and poultry without skin (turkey and chicken)  Examples of lean meats include pork leg, shoulder, or tenderloin, and beef round, sirloin, tenderloin, and extra lean ground beef  Other protein foods include eggs and egg substitutes, beans, peas, soy products, nuts, and seeds  · Choose low-fat dairy products such as skim or 1% milk or low-fat yogurt, cheese, and cottage cheese  · Limit unhealthy fats  such as butter, hard margarine, and shortening  Exercise:  Exercise at least 30 minutes per day on most days of the week  Some examples of exercise include walking, biking, dancing, and swimming  You can also fit in more physical activity by taking the stairs instead of the elevator or parking farther away from stores  Include muscle strengthening activities 2 days each week  Regular exercise provides many health benefits   It helps you manage your weight, and decreases your risk for type 2 diabetes, heart disease, stroke, and high blood pressure  Exercise can also help improve your mood  Ask your healthcare provider about the best exercise plan for you  General health and safety guidelines:   · Do not smoke  Nicotine and other chemicals in cigarettes and cigars can cause lung damage  Ask your healthcare provider for information if you currently smoke and need help to quit  E-cigarettes or smokeless tobacco still contain nicotine  Talk to your healthcare provider before you use these products  · Limit alcohol  A drink of alcohol is 12 ounces of beer, 5 ounces of wine, or 1½ ounces of liquor  · Lose weight, if needed  Being overweight increases your risk of certain health conditions  These include heart disease, high blood pressure, type 2 diabetes, and certain types of cancer  · Protect your skin  Do not sunbathe or use tanning beds  Use sunscreen with a SPF 15 or higher  Apply sunscreen at least 15 minutes before you go outside  Reapply sunscreen every 2 hours  Wear protective clothing, hats, and sunglasses when you are outside  · Drive safely  Always wear your seatbelt  Make sure everyone in your car wears a seatbelt  A seatbelt can save your life if you are in an accident  Do not use your cell phone when you are driving  This could distract you and cause an accident  Pull over if you need to make a call or send a text message  · Practice safe sex  Use latex condoms if are sexually active and have more than one partner  Your healthcare provider may recommend screening tests for sexually transmitted infections (STIs)  · Wear helmets, lifejackets, and protective gear  Always wear a helmet when you ride a bike or motorcycle, go skiing, or play sports that could cause a head injury  Wear protective equipment when you play sports  Wear a lifejacket when you are on a boat or doing water sports      © Copyright [employer] 2022 Information is for End User's use only and may not be sold, redistributed or otherwise used for commercial purposes  All illustrations and images included in CareNotes® are the copyrighted property of A D A M [employer]  or [employer]  The above information is an  only  It is not intended as medical advice for individual conditions or treatments  Talk to your doctor, nurse or pharmacist before following any medical regimen to see if it is safe and effective for you  Weight Management   AMBULATORY CARE:   Why it is important to manage your weight:  Being overweight increases your risk of health conditions such as heart disease, high blood pressure, type 2 diabetes, and certain types of cancer  It can also increase your risk for osteoarthritis, sleep apnea, and other respiratory problems  Aim for a slow, steady weight loss  Even a small amount of weight loss can lower your risk of health problems  Risks of being overweight:  Extra weight can cause many health problems, including the following:  · Diabetes (high blood sugar level)    · High blood pressure or high cholesterol    · Heart disease    · Stroke    · Gallbladder or liver disease    · Cancer of the colon, breast, prostate, liver, or kidney    · Sleep apnea    · Arthritis or gout    Screening  is done to check for health conditions before you have signs or symptoms  If you are 28to 79years old, your blood sugar level may be checked every 3 years for signs of prediabetes or diabetes  Your healthcare provider will check your blood pressure at each visit  High blood pressure can lead to a stroke or other problems  Your provider may check for signs of heart disease, cancer, or other health problems  How to lose weight safely:  A safe and healthy way to lose weight is to eat fewer calories and get regular exercise  · You can lose up about 1 pound a week by decreasing the number of calories you eat by 500 calories each day   You can decrease calories by eating smaller portion sizes or by cutting out high-calorie foods  Read labels to find out how many calories are in the foods you eat  · You can also burn calories with exercise such as walking, swimming, or biking  You will be more likely to keep weight off if you make these changes part of your lifestyle  Exercise at least 30 minutes per day on most days of the week  You can also fit in more physical activity by taking the stairs instead of the elevator or parking farther away from stores  Ask your healthcare provider about the best exercise plan for you  Healthy meal plan for weight management:  A healthy meal plan includes a variety of foods, contains fewer calories, and helps you stay healthy  A healthy meal plan includes the following:     · Eat whole-grain foods more often  A healthy meal plan should contain fiber  Fiber is the part of grains, fruits, and vegetables that is not broken down by your body  Whole-grain foods are healthy and provide extra fiber in your diet  Some examples of whole-grain foods are whole-wheat breads and pastas, oatmeal, brown rice, and bulgur  · Eat a variety of vegetables every day  Include dark, leafy greens such as spinach, kale, loc greens, and mustard greens  Eat yellow and orange vegetables such as carrots, sweet potatoes, and winter squash  · Eat a variety of fruits every day  Choose fresh or canned fruit (canned in its own juice or light syrup) instead of juice  Fruit juice has very little or no fiber  · Eat low-fat dairy foods  Drink fat-free (skim) milk or 1% milk  Eat fat-free yogurt and low-fat cottage cheese  Try low-fat cheeses such as mozzarella and other reduced-fat cheeses  · Choose meat and other protein foods that are low in fat  Choose beans or other legumes such as split peas or lentils  Choose fish, skinless poultry (chicken or turkey), or lean cuts of red meat (beef or pork)   Before you cook meat or poultry, cut off any visible fat  · Use less fat and oil  Try baking foods instead of frying them  Add less fat, such as margarine, sour cream, regular salad dressing and mayonnaise to foods  Eat fewer high-fat foods  Some examples of high-fat foods include french fries, doughnuts, ice cream, and cakes  · Eat fewer sweets  Limit foods and drinks that are high in sugar  This includes candy, cookies, regular soda, and sweetened drinks  Ways to decrease calories:   · Eat smaller portions  ? Use a small plate with smaller servings  ? Do not eat second helpings  ? When you eat at a restaurant, ask for a box and place half of your meal in the box before you eat  ? Share an entrée with someone else  · Replace high-calorie snacks with healthy, low-calorie snacks  ? Choose fresh fruit, vegetables, fat-free rice cakes, or air-popped popcorn instead of potato chips, nuts, or chocolate  ? Choose water or calorie-free drinks instead of soda or sweetened drinks  · Do not shop for groceries when you are hungry  You may be more likely to make unhealthy food choices  Take a grocery list of healthy foods and shop after you have eaten  · Eat regular meals  Do not skip meals  Skipping meals can lead to overeating later in the day  This can make it harder for you to lose weight  Eat a healthy snack in place of a meal if you do not have time to eat a regular meal  Talk with a dietitian to help you create a meal plan and schedule that is right for you  Other things to consider as you try to lose weight:   · Be aware of situations that may give you the urge to overeat, such as eating while watching television  Find ways to avoid these situations  For example, read a book, go for a walk, or do crafts  · Meet with a weight loss support group or friends who are also trying to lose weight  This may help you stay motivated to continue working on your weight loss goals      © Copyright [employer] 2022 Information is for End User's use only and may not be sold, redistributed or otherwise used for commercial purposes  All illustrations and images included in CareNotes® are the copyrighted property of A [employer] A M [employer]  or [employer]  The above information is an  only  It is not intended as medical advice for individual conditions or treatments  Talk to your doctor, nurse or pharmacist before following any medical regimen to see if it is safe and effective for you

## 2022-03-22 ENCOUNTER — APPOINTMENT (OUTPATIENT)
Dept: LAB | Facility: CLINIC | Age: 37
End: 2022-03-22
Payer: COMMERCIAL

## 2022-03-22 DIAGNOSIS — R07.9 CHEST PAIN, UNSPECIFIED TYPE: ICD-10-CM

## 2022-03-22 LAB
ALBUMIN SERPL BCP-MCNC: 4.1 G/DL (ref 3.5–5)
ALP SERPL-CCNC: 57 U/L (ref 46–116)
ALT SERPL W P-5'-P-CCNC: 48 U/L (ref 12–78)
ANION GAP SERPL CALCULATED.3IONS-SCNC: 2 MMOL/L (ref 4–13)
AST SERPL W P-5'-P-CCNC: 41 U/L (ref 5–45)
BASOPHILS # BLD AUTO: 0.03 THOUSANDS/ΜL (ref 0–0.1)
BASOPHILS NFR BLD AUTO: 1 % (ref 0–1)
BILIRUB SERPL-MCNC: 0.98 MG/DL (ref 0.2–1)
BUN SERPL-MCNC: 14 MG/DL (ref 5–25)
CALCIUM SERPL-MCNC: 9.2 MG/DL (ref 8.3–10.1)
CHLORIDE SERPL-SCNC: 104 MMOL/L (ref 100–108)
CHOLEST SERPL-MCNC: 153 MG/DL
CO2 SERPL-SCNC: 30 MMOL/L (ref 21–32)
CREAT SERPL-MCNC: 1.39 MG/DL (ref 0.6–1.3)
EOSINOPHIL # BLD AUTO: 0.05 THOUSAND/ΜL (ref 0–0.61)
EOSINOPHIL NFR BLD AUTO: 1 % (ref 0–6)
ERYTHROCYTE [DISTWIDTH] IN BLOOD BY AUTOMATED COUNT: 12.7 % (ref 11.6–15.1)
EST. AVERAGE GLUCOSE BLD GHB EST-MCNC: 100 MG/DL
GFR SERPL CREATININE-BSD FRML MDRD: 64 ML/MIN/1.73SQ M
GLUCOSE P FAST SERPL-MCNC: 89 MG/DL (ref 65–99)
HBA1C MFR BLD: 5.1 %
HCT VFR BLD AUTO: 47.8 % (ref 36.5–49.3)
HDLC SERPL-MCNC: 39 MG/DL
HGB BLD-MCNC: 16.4 G/DL (ref 12–17)
IMM GRANULOCYTES # BLD AUTO: 0.02 THOUSAND/UL (ref 0–0.2)
IMM GRANULOCYTES NFR BLD AUTO: 0 % (ref 0–2)
LDLC SERPL CALC-MCNC: 106 MG/DL (ref 0–100)
LYMPHOCYTES # BLD AUTO: 1.74 THOUSANDS/ΜL (ref 0.6–4.47)
LYMPHOCYTES NFR BLD AUTO: 39 % (ref 14–44)
MCH RBC QN AUTO: 31.8 PG (ref 26.8–34.3)
MCHC RBC AUTO-ENTMCNC: 34.3 G/DL (ref 31.4–37.4)
MCV RBC AUTO: 93 FL (ref 82–98)
MONOCYTES # BLD AUTO: 0.55 THOUSAND/ΜL (ref 0.17–1.22)
MONOCYTES NFR BLD AUTO: 12 % (ref 4–12)
NEUTROPHILS # BLD AUTO: 2.11 THOUSANDS/ΜL (ref 1.85–7.62)
NEUTS SEG NFR BLD AUTO: 47 % (ref 43–75)
NONHDLC SERPL-MCNC: 114 MG/DL
NRBC BLD AUTO-RTO: 0 /100 WBCS
PLATELET # BLD AUTO: 262 THOUSANDS/UL (ref 149–390)
PMV BLD AUTO: 9.5 FL (ref 8.9–12.7)
POTASSIUM SERPL-SCNC: 4.1 MMOL/L (ref 3.5–5.3)
PROT SERPL-MCNC: 7.9 G/DL (ref 6.4–8.2)
RBC # BLD AUTO: 5.16 MILLION/UL (ref 3.88–5.62)
SODIUM SERPL-SCNC: 136 MMOL/L (ref 136–145)
TRIGL SERPL-MCNC: 41 MG/DL
WBC # BLD AUTO: 4.5 THOUSAND/UL (ref 4.31–10.16)

## 2022-03-22 PROCEDURE — 80061 LIPID PANEL: CPT

## 2022-03-22 PROCEDURE — 36415 COLL VENOUS BLD VENIPUNCTURE: CPT

## 2022-03-22 PROCEDURE — 83036 HEMOGLOBIN GLYCOSYLATED A1C: CPT

## 2022-03-22 PROCEDURE — 80053 COMPREHEN METABOLIC PANEL: CPT

## 2022-03-22 PROCEDURE — 85025 COMPLETE CBC W/AUTO DIFF WBC: CPT

## 2022-05-31 ENCOUNTER — CONSULT (OUTPATIENT)
Dept: PLASTIC SURGERY | Facility: CLINIC | Age: 37
End: 2022-05-31
Payer: COMMERCIAL

## 2022-05-31 VITALS
DIASTOLIC BLOOD PRESSURE: 111 MMHG | BODY MASS INDEX: 34.3 KG/M2 | WEIGHT: 245 LBS | HEIGHT: 71 IN | HEART RATE: 75 BPM | TEMPERATURE: 98.1 F | SYSTOLIC BLOOD PRESSURE: 165 MMHG

## 2022-05-31 DIAGNOSIS — D24.2 FIBROADENOMA OF BREAST, LEFT: Primary | ICD-10-CM

## 2022-05-31 PROCEDURE — 99204 OFFICE O/P NEW MOD 45 MIN: CPT | Performed by: PLASTIC SURGERY

## 2022-05-31 NOTE — PROGRESS NOTES
Assessment/Plan   59-year-old male with bilateral gynecomastia left worse than right with significant pain and tenderness in the left breast  1 )  Patient is excellent candidate for excision of gynecomastia  2 )  I discussed the patient I would order mammogram to assess the possibility of malignancy   3 )  pending the results of the mammogram we can make arrangements to have this excised in the OR, I believe the patient is not a very good candidate for liposuction and would need excision, patient understands and agrees      Discussion--discussed the patient's findings consistent gynecomastia, the patient has a large fibrous component to the gynecomastia and has a very minimal glandular component clinically  Discussed with him the nature of gynecomastia, the risks, benefits, alternatives of treatment including conservative management versus liposuction versus excision  As the patient continues to have worsening of his symptoms I do not feel conservative management is appropriate, also the patient does not have a large glandular component, I feel that the patient is a better candidate for excision  I discussed with patient this is typically performed in the OR, I discussed with him the risks, benefits, alternatives including bleeding, infection, scarring, poor wound healing, damage surrounding and underlying structures, need further surgery, need for multiple procedures, high risk of seroma, under correction, over-correction, we discussed the expected hospital stay and expected recovery time, all the patient's questions were answered to his satisfaction, because of the, still a shin chronicity of his symptoms I do believe the that these are medically necessary, will inquire as to insurance authorization  Counseling dominated total counseling time 35 minutes, total visit time 45 minutes including documentation, review of his chart and coordination of care  Subjective   Patient ID: Enrique Rosa is a 39 y o  male     Vitals:    05/31/22 1506   BP: (!) 165/111   Pulse: 75   Temp: 98 1 °F (36 7 °C)     HPI patient is a 48-year-old male who is here today for consultation for bilateral breast lesions left worse than right  The patient states that he has had the lesions in the subareolar area for many years, however the past year they have become increasingly large, he has also had pain and significant tenderness, he has tried multiple conservative measures to no avail, the patient does not have major medical problems, he has never felt any lumps or masses in his testicles, he has not used marijuana in the he has not used anabolic steroids, he has not used any medications that are consistent with the onset of gynecomastia, he does not take steroids or blood thinners, he is a nonsmoker  He does not have a strong family history of breast cancer  The following portions of the patient's history were reviewed and updated as appropriate: allergies, current medications, past family history, past medical history, past social history, past surgical history and problem list     Review of Systems   Skin:        Pain in the bilateral breast per HPI       Objective   Physical Exam   Constitutional  He appears well-developed and well-nourished  Eyes  Pupils are equal, round, and reactive to light  System normal      General -             Right: Right eye extraocular movements are normal              Left: Left eye extraocular movements are normal        Skin  Skin is warm  Psychiatric  He has a normal mood and affect   His behavior is normal  Judgment and thought content normal    Bilateral breast--no ptosis, patient has  gynecomastia with a significant fibrous component in the left worse than right, he is very tender on the left breast, he is somewhat tender on the right, no adenopathy    Past Medical History:   Diagnosis Date    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     Past Surgical History:   Procedure Laterality Date    NC REVISION OF Dimitri N/A 2021    Procedure: SCROTOPLASTY;  Surgeon: Nik Pérez MD;  Location: [facility];  Service: Urology    VASECTOMY      VASECTOMY  2016     Current Outpatient Medications   Medication Instructions    acetaminophen (TYLENOL) 650 mg, Oral, Every 4 hours PRN    b complex vitamins tablet 1 tablet, Oral, Daily    multivitamin (THERAGRAN) TABS 1 tablet, Oral, Daily       Social History     Social History Narrative    Not on file     Social History     Tobacco Use   Smoking Status Former Smoker    Packs/day: 0 25    Quit date: 2021    Years since quittin 1   Smokeless Tobacco Never Used

## 2022-06-14 NOTE — PROGRESS NOTES
06/14/2022 @ 11AM- Called patient and provided central faxing number for him to get the [facility] scheduled  He stated he would call to schedule

## 2022-06-16 ENCOUNTER — HOSPITAL ENCOUNTER (EMERGENCY)
Facility: HOSPITAL | Age: 37
Discharge: HOME | End: 2022-06-16
Attending: EMERGENCY MEDICINE
Payer: COMMERCIAL

## 2022-06-16 VITALS
SYSTOLIC BLOOD PRESSURE: 153 MMHG | TEMPERATURE: 98.8 F | DIASTOLIC BLOOD PRESSURE: 67 MMHG | OXYGEN SATURATION: 99 % | RESPIRATION RATE: 18 BRPM | HEART RATE: 81 BPM

## 2022-06-16 DIAGNOSIS — L02.11 ABSCESS OF NECK: Primary | ICD-10-CM

## 2022-06-16 PROCEDURE — 10060 I&D ABSCESS SIMPLE/SINGLE: CPT | Performed by: EMERGENCY MEDICINE

## 2022-06-16 PROCEDURE — 99283 EMERGENCY DEPT VISIT LOW MDM: CPT

## 2022-06-16 PROCEDURE — 99284 EMERGENCY DEPT VISIT MOD MDM: CPT | Performed by: EMERGENCY MEDICINE

## 2022-06-16 RX ORDER — NAPROXEN 250 MG/1
250 TABLET ORAL ONCE
Status: COMPLETED | OUTPATIENT
Start: 2022-06-16 | End: 2022-06-16

## 2022-06-16 RX ORDER — LIDOCAINE HYDROCHLORIDE AND EPINEPHRINE 20; 5 MG/ML; UG/ML
1 INJECTION, SOLUTION EPIDURAL; INFILTRATION; INTRACAUDAL; PERINEURAL ONCE
Status: COMPLETED | OUTPATIENT
Start: 2022-06-16 | End: 2022-06-16

## 2022-06-16 RX ORDER — SULFAMETHOXAZOLE AND TRIMETHOPRIM 800; 160 MG/1; MG/1
1 TABLET ORAL ONCE
Status: COMPLETED | OUTPATIENT
Start: 2022-06-16 | End: 2022-06-16

## 2022-06-16 RX ORDER — NAPROXEN 250 MG/1
250 TABLET ORAL 2 TIMES DAILY WITH MEALS
Qty: 20 TABLET | Refills: 0 | Status: SHIPPED | OUTPATIENT
Start: 2022-06-16

## 2022-06-16 RX ORDER — SULFAMETHOXAZOLE AND TRIMETHOPRIM 800; 160 MG/1; MG/1
1 TABLET ORAL 2 TIMES DAILY
Qty: 14 TABLET | Refills: 0 | Status: SHIPPED | OUTPATIENT
Start: 2022-06-16 | End: 2022-06-23

## 2022-06-16 RX ADMIN — NAPROXEN 250 MG: 250 TABLET ORAL at 21:38

## 2022-06-16 RX ADMIN — LIDOCAINE HYDROCHLORIDE AND EPINEPHRINE 1 ML: 20; 5 INJECTION, SOLUTION EPIDURAL; INFILTRATION; INTRACAUDAL; PERINEURAL at 21:14

## 2022-06-16 RX ADMIN — SULFAMETHOXAZOLE AND TRIMETHOPRIM 1 TABLET: 800; 160 TABLET ORAL at 21:14
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
## 2022-06-22 ENCOUNTER — APPOINTMENT (EMERGENCY)
Dept: CT IMAGING | Facility: HOSPITAL | Age: 37
End: 2022-06-22
Payer: COMMERCIAL

## 2022-06-22 ENCOUNTER — APPOINTMENT (EMERGENCY)
Dept: RADIOLOGY | Facility: HOSPITAL | Age: 37
End: 2022-06-22
Payer: COMMERCIAL

## 2022-06-22 ENCOUNTER — HOSPITAL ENCOUNTER (EMERGENCY)
Facility: HOSPITAL | Age: 37
Discharge: HOME | End: 2022-06-22
Attending: EMERGENCY MEDICINE
Payer: COMMERCIAL

## 2022-06-22 VITALS
TEMPERATURE: 98.9 F | DIASTOLIC BLOOD PRESSURE: 96 MMHG | OXYGEN SATURATION: 98 % | SYSTOLIC BLOOD PRESSURE: 158 MMHG | HEART RATE: 90 BPM | RESPIRATION RATE: 18 BRPM
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
## 2022-06-22 PROCEDURE — 73630 X-RAY EXAM OF FOOT: CPT

## 2022-06-22 PROCEDURE — 99284 EMERGENCY DEPT VISIT MOD MDM: CPT

## 2022-06-22 PROCEDURE — 70450 CT HEAD/BRAIN W/O DYE: CPT

## 2022-06-22 PROCEDURE — 99284 EMERGENCY DEPT VISIT MOD MDM: CPT | Performed by: EMERGENCY MEDICINE

## 2022-06-22 PROCEDURE — 73130 X-RAY EXAM OF HAND: CPT

## 2022-06-22 PROCEDURE — 72125 CT NECK SPINE W/O DYE: CPT

## 2022-06-22 RX ORDER — NAPROXEN 250 MG/1
500 TABLET ORAL ONCE
Status: COMPLETED | OUTPATIENT
Start: 2022-06-22 | End: 2022-06-22

## 2022-06-22 RX ADMIN — NAPROXEN 500 MG: 250 TABLET ORAL at 16:26
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
## 2022-07-08 ENCOUNTER — HOSPITAL ENCOUNTER (OUTPATIENT)
Dept: ULTRASOUND IMAGING | Facility: CLINIC | Age: 37
Discharge: HOME | End: 2022-07-08
Payer: COMMERCIAL

## 2022-07-08 ENCOUNTER — HOSPITAL ENCOUNTER (OUTPATIENT)
Dept: MAMMOGRAPHY | Facility: CLINIC | Age: 37
Discharge: HOME | End: 2022-07-08
Payer: COMMERCIAL

## 2022-07-08 VITALS — WEIGHT: 235 LBS | HEIGHT: 71 IN | BODY MASS INDEX: 32.9 KG/M2

## 2022-07-08 DIAGNOSIS — N63.0 BREAST LUMP: ICD-10-CM

## 2022-07-08 DIAGNOSIS — D24.2 FIBROADENOMA OF BREAST, LEFT: ICD-10-CM

## 2022-07-08 PROCEDURE — 77066 DX MAMMO INCL CAD BI: CPT

## 2022-07-08 PROCEDURE — 76642 ULTRASOUND BREAST LIMITED: CPT

## 2022-10-07 ENCOUNTER — PREP FOR PROCEDURE (OUTPATIENT)
Dept: PLASTIC SURGERY | Facility: CLINIC | Age: 37
End: 2022-10-07

## 2022-10-07 DIAGNOSIS — D24.1 BENIGN NEOPLASM OF RIGHT BREAST: ICD-10-CM

## 2022-10-07 DIAGNOSIS — D24.2 BENIGN NEOPLASM OF LEFT BREAST: Primary | ICD-10-CM

## 2022-10-25 ENCOUNTER — TELEPHONE (OUTPATIENT)
Dept: FAMILY MEDICINE CLINIC | Facility: CLINIC | Age: 37
End: 2022-10-25

## 2022-10-25 ENCOUNTER — OFFICE VISIT (OUTPATIENT)
Dept: FAMILY MEDICINE CLINIC | Facility: CLINIC | Age: 37
End: 2022-10-25
Payer: COMMERCIAL

## 2022-10-25 VITALS
OXYGEN SATURATION: 97 % | HEART RATE: 64 BPM | SYSTOLIC BLOOD PRESSURE: 142 MMHG | TEMPERATURE: 97.7 F | DIASTOLIC BLOOD PRESSURE: 82 MMHG | WEIGHT: 246.6 LBS | HEIGHT: 71 IN | BODY MASS INDEX: 34.52 KG/M2

## 2022-10-25 DIAGNOSIS — Z01.818 PRE-OP EXAM: Primary | ICD-10-CM

## 2022-10-25 DIAGNOSIS — M21.612 BUNION, LEFT: ICD-10-CM

## 2022-10-25 PROCEDURE — 99213 OFFICE O/P EST LOW 20 MIN: CPT | Performed by: STUDENT IN AN ORGANIZED HEALTH CARE EDUCATION/TRAINING PROGRAM

## 2022-10-25 NOTE — PROGRESS NOTES
FAMILY MEDICINE PRE-OPERATIVE EVALUATION  [address] [facility]    NAME: Sol Case  AGE: 39 y o  SEX: male  : 1985     DATE: 10/25/2022     Internal Medicine Pre-Operative Evaluation:     Chief Complaint: Pre-operative Evaluation     Surgery: Ruthann Carrasquillo  Anticipated Date of Surgery: 11/15/2022  Referring Provider: No ref  provider found        History of Present Illness:     Sol Case is a 39 y o  male who presents to the office today for a preoperative consultation at the request of surgeon, Dr Shailesh Bruce, who plans on performing Left Ruthann Carrasquillo on 11/15/2022  Planned anesthesia is general  Patient has a bleeding risk of: no recent abnormal bleeding, no remote history of abnormal bleeding and no use of Ca-channel blockers  Patient does not have objections to receiving blood products if needed  Current anti-platelet/anti-coagulation medications that the patient is prescribed includes: none  Assessment of Chronic Conditions:   - none     Assessment of Cardiac Risk:  Denies unstable or severe angina or MI in the last 6 weeks or history of stent placement in the last year   Denies decompensated heart failure (e g  New onset heart failure, NYHA functional class IV heart failure, or worsening existing heart failure)  Denies significant arrhythmias such as high grade AV block, symptomatic ventricular arrhythmia, newly recognized ventricular tachycardia, supraventricular tachycardia with resting heart rate >100, or symptomatic bradycardia  Denies severe heart valve disease including aortic stenosis or symptomatic mitral stenosis     Exercise Capacity:  Able to walk 4 blocks without symptoms?: Yes  Able to walk 2 flights without symptoms?: Yes    Prior Anesthesia Reactions: No     Personal history of venous thromboembolic disease? No    History of steroid use for >2 weeks within last year?  No      Review of Systems: Review of Systems   Constitutional: Negative for chills, fatigue and fever  HENT: Negative for rhinorrhea and sore throat  Eyes: Negative for visual disturbance  Respiratory: Negative for cough and shortness of breath  Cardiovascular: Negative for chest pain and palpitations  Gastrointestinal: Negative for abdominal pain, constipation, diarrhea, nausea and vomiting  Genitourinary: Negative for difficulty urinating, dysuria and frequency  Musculoskeletal: Negative for arthralgias and myalgias  Skin: Negative for color change and rash  Neurological: Negative for weakness and headaches          Problem List:     Patient Active Problem List   Diagnosis   • Chest pain   • BMI 35 0-35 9,adult   • Fibroadenoma of breast, left   • History of colon polyps   • Elevated blood pressure reading        Allergies:     No Known Allergies     Current Medications:       Current Outpatient Medications:   •  acetaminophen (TYLENOL) 325 mg tablet, Take 2 tablets (650 mg total) by mouth every 4 (four) hours as needed for mild pain (Patient not taking: Reported on 10/25/2022), Disp: 30 tablet, Rfl: 0  •  b complex vitamins tablet, Take 1 tablet by mouth daily (Patient not taking: Reported on 10/25/2022), Disp: , Rfl:   •  multivitamin (THERAGRAN) TABS, Take 1 tablet by mouth daily (Patient not taking: Reported on 10/25/2022), Disp: , Rfl:   •  naproxen (NAPROSYN) 250 mg tablet, Take 1 tablet (250 mg total) by mouth 2 (two) times a day with meals (Patient not taking: Reported on 10/25/2022), Disp: 20 tablet, Rfl: 0     Past History:     Past Medical History:   Diagnosis Date   • 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        Past Surgical History:   Procedure Laterality Date   • ND REVISION OF Stevestad N/A 7/1/2021    Procedure: Madiha Kelley;  Surgeon: Roger Borden MD;  Location: [facility] OR;  Service: Urology   • VASECTOMY     • VASECTOMY  2016        Family History   Problem Relation Age of Onset   • Heart disease Mother    • No Known Problems Father    • No Known Problems Daughter    • No Known Problems Daughter    • No Known Problems Paternal Grandmother    • Colon cancer Paternal Grandfather    • No Known Problems Son    • Breast cancer Neg Hx         Social History     Socioeconomic History   • Marital status: Single     Spouse name: Not on file   • Number of children: Not on file   • Years of education: Not on file   • Highest education level: Not on file   Occupational History   • Not on file   Tobacco Use   • Smoking status: Former Smoker     Packs/day: 0 25     Quit date: 2021     Years since quittin 5   • Smokeless tobacco: Never Used   Vaping Use   • Vaping Use: Never used   Substance and Sexual Activity   • Alcohol use: Yes     Comment: social 3x a year   • Drug use: Never   • Sexual activity: Not on file   Other Topics Concern   • Not on file   Social History Narrative   • Not on file     Social Determinants of Health     Financial Resource Strain: Not on file   Food Insecurity: Not on file   Transportation Needs: Not on file   Physical Activity: Not on file   Stress: Not on file   Social Connections: Not on file   Intimate Partner Violence: Not on file   Housing Stability: Not on file        Physical Exam:      /82   Pulse 64   Temp 97 7 °F (36 5 °C)   Ht 5' 11" (1 803 m)   Wt 112 kg (246 lb 9 6 oz)   SpO2 97%   BMI 34 39 kg/m²     Physical Exam  Constitutional:       General: He is not in acute distress  Appearance: He is not ill-appearing  HENT:      Head: Normocephalic and atraumatic  Right Ear: External ear normal       Left Ear: External ear normal       Nose: Nose normal  No congestion or rhinorrhea  Mouth/Throat:      Mouth: Mucous membranes are moist       Pharynx: Oropharynx is clear  No oropharyngeal exudate or posterior oropharyngeal erythema  Eyes:      Extraocular Movements: Extraocular movements intact        Conjunctiva/sclera: Conjunctivae normal       Pupils: Pupils are equal, round, and reactive to light  Cardiovascular:      Rate and Rhythm: Normal rate and regular rhythm  Pulses: Normal pulses  Heart sounds: No murmur heard  Pulmonary:      Effort: Pulmonary effort is normal  No respiratory distress  Breath sounds: Normal breath sounds  No wheezing  Chest:      Chest wall: No tenderness  Abdominal:      General: Bowel sounds are normal       Palpations: Abdomen is soft  Tenderness: There is no abdominal tenderness  Musculoskeletal:         General: Normal range of motion  Cervical back: Normal range of motion  Skin:     General: Skin is warm and dry  Capillary Refill: Capillary refill takes less than 2 seconds  Findings: No rash  Neurological:      General: No focal deficit present  Mental Status: He is alert  Mental status is at baseline  Data:     Pre-operative work-up    Laboratory Results: I have personally reviewed the pertinent laboratory results/reports      Assessment:     1  Pre-op exam  Comprehensive metabolic panel    CBC and differential    Protime-INR    APTT   2  Bunion, left          Plan:     39 y o  male with planned surgery:        Cardiac Risk Estimation: per the Revised Cardiac Risk Index (Circ  100:[id number], 1999), the patient's risk factors for cardiac complications include none, putting him in: RCI RISK CLASS I (0 risk factors, risk of major cardiac compl  appr  0 5%)  1  Further preoperative workup as follows:   - None; no further preoperative work-up is required    2  Medication Management/Recommendations:   - Not applicable, not on any medications    3  Prophylaxis for cardiac events with perioperative beta-blockers: not indicated  4  Patient requires further consultation with: None    Clearance  Patient is CLEARED for surgery without any additional cardiac testing       MD Darcie Davis [address] [place] [address] CESILIA Lerma 11 [id number]  Phone#  [phone]  Fax#  [phone]

## 2022-10-25 NOTE — TELEPHONE ENCOUNTER
----- Message from Jodee Zaragoza MD sent at 10/25/2022 12:33 PM EDT -----  Can we fax this to Dr Galdino Barnes of [facility]   Thank you

## 2022-11-09 ENCOUNTER — APPOINTMENT (OUTPATIENT)
Dept: OCCUPATIONAL THERAPY | Facility: MEDICAL CENTER | Age: 37
End: 2022-11-09

## 2022-11-18 ENCOUNTER — OFFICE VISIT (OUTPATIENT)
Dept: PLASTIC SURGERY | Facility: CLINIC | Age: 37
End: 2022-11-18

## 2022-11-18 VITALS
HEIGHT: 71 IN | WEIGHT: 246 LBS | BODY MASS INDEX: 34.44 KG/M2 | HEART RATE: 67 BPM | SYSTOLIC BLOOD PRESSURE: 140 MMHG | TEMPERATURE: 97.4 F | DIASTOLIC BLOOD PRESSURE: 87 MMHG

## 2022-11-18 DIAGNOSIS — D24.2 FIBROADENOMA OF BREAST, LEFT: Primary | ICD-10-CM

## 2022-11-18 DIAGNOSIS — N62 GYNECOMASTIA: ICD-10-CM

## 2022-11-18 NOTE — PROGRESS NOTES
The patient is a 53-year-old male presenting today for a preoperative history and physical   The patient is scheduled for excision of bilateral gynecomastia on 12/09/2022  However, the patient states that he is starting a new job and unfortunately will not have time available to take off for surgery and recovery  The patient is requesting to postpone his surgery and reschedule when he has available PTO  The patient reports that he is going to be a  and his job will require moderate physical activity  I discussed with the patient that I agree with his decision to reschedule his surgery, as he will require 4-6 weeks of postoperative recovery and restrictions  I advised the patient that he should not be pulling, pushing, or lifting more than 10 lb for about 4 weeks or doing any other strenuous activity or repetitive arm motions in that timeframe  Advised the patient to call when he is available to reschedule surgery  Patient understands and agrees to the plan, no additional questions or concerns at this time          Ke Streeter PA-C  Plastic & Reconstructive Surgery

## 2023-01-19 ENCOUNTER — HOSPITAL ENCOUNTER (EMERGENCY)
Facility: HOSPITAL | Age: 38
Discharge: HOME | End: 2023-01-19
Attending: EMERGENCY MEDICINE

## 2023-01-19 VITALS
TEMPERATURE: 98.1 F | SYSTOLIC BLOOD PRESSURE: 148 MMHG | DIASTOLIC BLOOD PRESSURE: 80 MMHG | HEART RATE: 77 BPM | RESPIRATION RATE: 20 BRPM | OXYGEN SATURATION: 98 %

## 2023-01-19 DIAGNOSIS — L02.91 ABSCESS: Primary | ICD-10-CM

## 2023-01-19 RX ORDER — LIDOCAINE HYDROCHLORIDE 20 MG/ML
5 INJECTION, SOLUTION EPIDURAL; INFILTRATION; INTRACAUDAL; PERINEURAL ONCE
Status: COMPLETED | OUTPATIENT
Start: 2023-01-19 | End: 2023-01-19

## 2023-01-19 RX ORDER — SULFAMETHOXAZOLE AND TRIMETHOPRIM 800; 160 MG/1; MG/1
1 TABLET ORAL 2 TIMES DAILY
Qty: 14 TABLET | Refills: 0 | Status: SHIPPED | OUTPATIENT
Start: 2023-01-19 | End: 2023-01-26

## 2023-01-19 RX ADMIN — LIDOCAINE HYDROCHLORIDE 5 ML: 20 INJECTION, SOLUTION EPIDURAL; INFILTRATION; INTRACAUDAL; PERINEURAL at 09:59

## 2023-01-19 NOTE — ED PROVIDER NOTES
History  Chief Complaint   Patient presents with   • Abscess     Pt c/o abscess on back of head, causing pain  Pt states hx of this before back in June and was drained  Patient is a 58-year-old male with past medical history of abscess presenting with abscess  Patient states that he had abscess to the back of his head drained roughly 7 months ago and did note large drainage  States he was discharged and that resolved  States within the last week it has begun growing again and notes constant throbbing pain to the area which is nonradiating but denies any drainage  Has not applied any medications or done anything to it  Denies any fevers  Prior to Admission Medications   Prescriptions Last Dose Informant Patient Reported?  Taking?   acetaminophen (TYLENOL) 325 mg tablet   No No   Sig: Take 2 tablets (650 mg total) by mouth every 4 (four) hours as needed for mild pain   Patient not taking: Reported on 10/25/2022   b complex vitamins tablet   Yes No   Sig: Take 1 tablet by mouth daily   Patient not taking: Reported on 10/25/2022   multivitamin (THERAGRAN) TABS   Yes No   Sig: Take 1 tablet by mouth daily   Patient not taking: Reported on 10/25/2022   naproxen (NAPROSYN) 250 mg tablet   No No   Sig: Take 1 tablet (250 mg total) by mouth 2 (two) times a day with meals   Patient not taking: Reported on 10/25/2022      Facility-Administered Medications: None       Past Medical History:   Diagnosis Date   • 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       Past Surgical History:   Procedure Laterality Date   • TN SCROTOPLASTY SIMPLE N/A 7/1/2021    Procedure: Lucho Fontana;  Surgeon: Mansoor Deluca MD;  Location: [facility];  Service: Urology   • VASECTOMY     • VASECTOMY  2016       Family History   Problem Relation Age of Onset   • Heart disease Mother    • No Known Problems Father    • No Known Problems Daughter    • No Known Problems Daughter    • No Known Problems Paternal Grandmother    • Colon cancer Paternal Grandfather    • No Known Problems Son    • Breast cancer Neg Hx      I have reviewed and agree with the history as documented  E-Cigarette/Vaping   • E-Cigarette Use Never User      E-Cigarette/Vaping Substances     Social History     Tobacco Use   • Smoking status: Former     Packs/day: 0 25     Types: Cigarettes     Quit date: 2021     Years since quittin 8   • Smokeless tobacco: Never   Vaping Use   • Vaping Use: Never used   Substance Use Topics   • Alcohol use: Yes     Comment: social 3x a year   • Drug use: Never       Review of Systems   All other systems reviewed and are negative  Physical Exam  Physical Exam  Vitals reviewed  Constitutional:       General: He is not in acute distress  Appearance: Normal appearance  He is not ill-appearing  HENT:      Head:      Comments: Patient has roughly 2 cm indurated area to the middle occiput with no fluctuance, mild tenderness, no erythema, increased warmth     Mouth/Throat:      Mouth: Mucous membranes are moist    Eyes:      Conjunctiva/sclera: Conjunctivae normal    Cardiovascular:      Rate and Rhythm: Normal rate  Pulmonary:      Effort: Pulmonary effort is normal    Musculoskeletal:         General: Normal range of motion  Cervical back: Neck supple  Skin:     General: Skin is warm and dry  Neurological:      General: No focal deficit present  Mental Status: He is alert     Psychiatric:         Mood and Affect: Mood normal          Vital Signs  ED Triage Vitals   Temperature Pulse Respirations Blood Pressure SpO2   23 0944 23 0942 23 0942 23 0942 23 0942   98 1 °F (36 7 °C) 74 16 170/85 100 %      Temp Source Heart Rate Source Patient Position - Orthostatic VS BP Location FiO2 (%)   23 0944 23 0942 23 0942 23 0942 --   Oral Monitor Sitting Right arm       Pain Score       23 0942       8           Vitals:    23 0942 23 1000 23 1045   BP: 170/85 152/88 148/80   Pulse: 74 75 77   Patient Position - Orthostatic VS: Sitting           Visual Acuity      ED Medications  Medications   lidocaine (PF) (XYLOCAINE-MPF) 2 % injection 5 mL (5 mL Infiltration Given 1/19/23 0959)       Diagnostic Studies  Results Reviewed     None                 No orders to display              Procedures  Incision and drain    Date/Time: 1/19/2023 2:31 PM  Performed by: Geri Milian DO  Authorized by: Geri Milian DO   Universal Protocol:  Consent: Verbal consent obtained  Consent given by: patient  Patient understanding: patient states understanding of the procedure being performed  Patient consent: the patient's understanding of the procedure matches consent given  Procedure consent: procedure consent matches procedure scheduled  Relevant documents: relevant documents present and verified  Test results: test results available and properly labeled  Site marked: the operative site was marked  Radiology Images displayed and confirmed  If images not available, report reviewed: imaging studies available  Patient identity confirmed: verbally with patient      Patient location:  ED  Location:     Type:  Abscess    Size:  2    Location:  Head/neck    Head/neck location:  Scalp  Pre-procedure details:     Skin preparation:  Antiseptic wash  Anesthesia (see MAR for exact dosages): Anesthesia method:  Local infiltration    Local anesthetic:  Lidocaine 2% w/o epi  Procedure details:     Complexity:  Simple    Needle aspiration: no      Incision types:  Single straight    Scalpel blade:  11    Approach:  Open    Incision depth:  Subcutaneous    Wound management:  Probed and deloculated    Drainage:  Bloody and purulent    Drainage amount:  Scant    Wound treatment:  Wound left open    Packing materials:  1/2 in gauze  Post-procedure details:     Patient tolerance of procedure:   Tolerated well, no immediate complications             ED Course  ED Course as of 01/19/23 6948 Heatheru Jan 19, 2023   1046 I&D                               SBIRT 22yo+    Flowsheet Row Most Recent Value   SBIRT (25 yo +)    In order to provide better care to our patients, we are screening all of our patients for alcohol and drug use  Would it be okay to ask you these screening questions? Yes Filed at: 01/19/2023 0941   Initial Alcohol Screen: US AUDIT-C     1  How often do you have a drink containing alcohol? 0 Filed at: 01/19/2023 0941   2  How many drinks containing alcohol do you have on a typical day you are drinking? 0 Filed at: 01/19/2023 0941   3a  Male UNDER 65: How often do you have five or more drinks on one occasion? 0 Filed at: 01/19/2023 0941   3b  FEMALE Any Age, or MALE 65+: How often do you have 4 or more drinks on one occassion? 0 Filed at: 01/19/2023 0941   Audit-C Score 0 Filed at: 01/19/2023 8245   RODO: How many times in the past year have you    Used an illegal drug or used a prescription medication for non-medical reasons? Never Filed at: 01/19/2023 0941                    Medical Decision Making  Patient is a 27-year-old male past medical history of abscess to the back of his head presenting with abscess versus cellulitis  Patient is well-appearing at bedside with stable vitals and in no acute distress  He has roughly 2 cm indurated tender area to the back of his head with no fluctuance, no erythema, no increased warmth no purulent drainage  Bedside ultrasound does not show any discrete collection however patient states that he would like attempted drainage today when offered though I have advised that this may not be therapeutic  Will attempt I&D and if no significant output will discharge on antibiotics  Risk  Prescription drug management            Disposition  Final diagnoses:   Abscess     Time reflects when diagnosis was documented in both MDM as applicable and the Disposition within this note     Time User Action Codes Description Comment    1/19/2023 10:46 AM Maria Perry Add [L02 [id number]] Abscess       ED Disposition     ED Disposition   Discharge    Condition   Stable    Date/Time   Thu Jan 19, 2023 10:46 AM    Comment   Daniella List discharge to home/self care  Follow-up Information     Follow up With Specialties Details Why Contact Info Additional Information    Sang Figueroa MD Family Medicine In 1 week  [address]       [address] [facility] Emergency Department Emergency Medicine  If symptoms worsen [address] Emergency Department, [address] [place] [address], [facility]          Discharge Medication List as of 1/19/2023 10:47 AM      START taking these medications    Details   sulfamethoxazole-trimethoprim (BACTRIM DS) 800-160 mg per tablet Take 1 tablet by mouth 2 (two) times a day for 7 days smx-tmp DS (BACTRIM) 800-160 mg tabs (1tab q12 D10), Starting Thu 1/19/2023, Until Thu 1/26/2023, Normal         CONTINUE these medications which have NOT CHANGED    Details   acetaminophen (TYLENOL) 325 mg tablet Take 2 tablets (650 mg total) by mouth every 4 (four) hours as needed for mild pain, Starting Thu 7/1/2021, No Print      b complex vitamins tablet Take 1 tablet by mouth daily, Historical Med      multivitamin (THERAGRAN) TABS Take 1 tablet by mouth daily, Historical Med      naproxen (NAPROSYN) 250 mg tablet Take 1 tablet (250 mg total) by mouth 2 (two) times a day with meals, Starting Thu 6/16/2022, Print             No discharge procedures on file      PDMP Review     None          ED Provider  Electronically Signed by           Shana Collado DO  01/19/23 1944

## 2023-01-31 ENCOUNTER — TELEPHONE (OUTPATIENT)
Dept: FAMILY MEDICINE CLINIC | Facility: CLINIC | Age: 38
End: 2023-01-31

## 2023-01-31 DIAGNOSIS — H53.9 CHANGE IN VISION: Primary | ICD-10-CM

## 2023-01-31 NOTE — TELEPHONE ENCOUNTER
Pt calling -    Would like to see an Eye Doctor for an evaluation  Please notify pt through myc       Please advise

## 2023-03-16 ENCOUNTER — OFFICE VISIT (OUTPATIENT)
Dept: FAMILY MEDICINE CLINIC | Facility: CLINIC | Age: 38
End: 2023-03-16

## 2023-03-16 VITALS
WEIGHT: 251 LBS | OXYGEN SATURATION: 96 % | SYSTOLIC BLOOD PRESSURE: 138 MMHG | DIASTOLIC BLOOD PRESSURE: 84 MMHG | BODY MASS INDEX: 35.14 KG/M2 | HEART RATE: 76 BPM | HEIGHT: 71 IN | TEMPERATURE: 99.6 F

## 2023-03-16 DIAGNOSIS — Z12.5 SCREENING FOR PROSTATE CANCER: ICD-10-CM

## 2023-03-16 DIAGNOSIS — Z13.1 SCREENING FOR DIABETES MELLITUS: ICD-10-CM

## 2023-03-16 DIAGNOSIS — Z00.00 ANNUAL PHYSICAL EXAM: Primary | ICD-10-CM

## 2023-03-16 DIAGNOSIS — Z01.818 PRE-OP EVALUATION: ICD-10-CM

## 2023-03-16 DIAGNOSIS — Z13.6 SCREENING FOR CARDIOVASCULAR CONDITION: ICD-10-CM

## 2023-03-16 NOTE — PROGRESS NOTES
[address]     NAME: Max Salomon  AGE: 44 y o  SEX: male  : 1985     DATE: 3/16/2023     Assessment and Plan:     Problem List Items Addressed This Visit        Other    BMI 35 0-35 9,adult    Relevant Orders    TSH, 3rd generation with Free T4 reflex   Other Visit Diagnoses     Annual physical exam    -  Primary    Relevant Orders    CBC and Platelet    Screening for diabetes mellitus        Relevant Orders    Hemoglobin A1C    Screening for cardiovascular condition        Relevant Orders    Lipid panel    Comprehensive metabolic panel    Pre-op evaluation              Immunizations and preventive care screenings were discussed with patient today  Appropriate education was printed on patient's after visit summary  Counseling:  Exercise: the importance of regular exercise/physical activity was discussed  Recommend exercise 3-5 times per week for at least 30 minutes  Return in 1 year (on 3/16/2024)  Chief Complaint:     Chief Complaint   Patient presents with   • Physical Exam      History of Present Illness:     Adult Annual Physical   Patient here for a comprehensive physical exam  The patient reports no problems  Diet and Physical Activity  Diet/Nutrition: well balanced diet  Exercise: vigorous cardiovascular exercise, strength training exercises and 3-4 times a week on average  Depression Screening  PHQ-2/9 Depression Screening    Little interest or pleasure in doing things: 0 - not at all  Feeling down, depressed, or hopeless: 0 - not at all  PHQ-2 Score: 0  PHQ-2 Interpretation: Negative depression screen       General Health  Sleep: sleeps well  Hearing: normal - bilateral   Vision: no vision problems and goes for regular eye exams  Dental: regular dental visits          Health  History of STDs?: no      Review of Systems:     Review of Systems   Constitutional: Negative for chills, fatigue and fever  HENT: Negative for rhinorrhea and sore throat  Eyes: Negative for visual disturbance  Respiratory: Negative for cough and shortness of breath  Cardiovascular: Negative for chest pain and palpitations  Gastrointestinal: Negative for abdominal pain, constipation, diarrhea, nausea and vomiting  Genitourinary: Negative for difficulty urinating, dysuria and frequency  Musculoskeletal: Negative for arthralgias and myalgias  Skin: Negative for color change and rash  Neurological: Negative for weakness and headaches        Past Medical History:     Past Medical History:   Diagnosis Date   • Webbed penis 2021      Past Surgical History:     Past Surgical History:   Procedure Laterality Date   • GA SCROTOPLASTY SIMPLE N/A 2021    Procedure: Ye Zaidi;  Surgeon: Nayely Brantley MD;  Location: [facility];  Service: Urology   • VASECTOMY     • VASECTOMY        Social History:     Social History     Socioeconomic History   • Marital status: Single     Spouse name: None   • Number of children: None   • Years of education: None   • Highest education level: None   Occupational History   • None   Tobacco Use   • Smoking status: Former     Packs/day: 0 25     Types: Cigarettes     Quit date: 2021     Years since quittin 9   • Smokeless tobacco: Never   Vaping Use   • Vaping Use: Never used   Substance and Sexual Activity   • Alcohol use: Yes     Comment: social 3x a year   • Drug use: Never   • Sexual activity: None   Other Topics Concern   • None   Social History Narrative   • None     Social Determinants of Health     Financial Resource Strain: Not on file   Food Insecurity: Not on file   Transportation Needs: Not on file   Physical Activity: Not on file   Stress: Not on file   Social Connections: Not on file   Intimate Partner Violence: Not on file   Housing Stability: Not on file      Family History:     Family History   Problem Relation Age of Onset   • Heart disease Mother    • No Known Problems Father    • No Known Problems Daughter    • No Known Problems Daughter    • No Known Problems Paternal Grandmother    • Colon cancer Paternal Grandfather    • No Known Problems Son    • Breast cancer Neg Hx       Current Medications:     Current Outpatient Medications   Medication Sig Dispense Refill   • b complex vitamins tablet Take 1 tablet by mouth daily (Patient not taking: Reported on 3/16/2023)     • multivitamin (THERAGRAN) TABS Take 1 tablet by mouth daily (Patient not taking: Reported on 3/16/2023)       No current facility-administered medications for this visit  Allergies:     No Known Allergies   Physical Exam:     /84 (BP Location: Left arm, Patient Position: Sitting, Cuff Size: Standard)   Pulse 76   Temp 99 6 °F (37 6 °C)   Ht 5' 11" (1 803 m)   Wt 114 kg (251 lb)   SpO2 96%   BMI 35 01 kg/m²     Physical Exam  Constitutional:       General: He is not in acute distress  Appearance: Normal appearance  He is not ill-appearing  HENT:      Head: Normocephalic and atraumatic  Right Ear: Tympanic membrane, ear canal and external ear normal       Left Ear: Tympanic membrane, ear canal and external ear normal       Nose: Nose normal       Mouth/Throat:      Mouth: Mucous membranes are moist       Pharynx: Oropharynx is clear  No oropharyngeal exudate or posterior oropharyngeal erythema  Eyes:      General: No scleral icterus  Right eye: No discharge  Left eye: No discharge  Extraocular Movements: Extraocular movements intact  Conjunctiva/sclera: Conjunctivae normal       Pupils: Pupils are equal, round, and reactive to light  Cardiovascular:      Rate and Rhythm: Normal rate and regular rhythm  Pulses: Normal pulses  Heart sounds: Normal heart sounds  No murmur heard  Pulmonary:      Effort: Pulmonary effort is normal  No respiratory distress  Breath sounds: Normal breath sounds  Abdominal:      General: Bowel sounds are normal       Palpations: Abdomen is soft  Tenderness: There is no abdominal tenderness  Musculoskeletal:         General: Normal range of motion  Cervical back: Normal range of motion and neck supple  Lymphadenopathy:      Cervical: No cervical adenopathy  Skin:     General: Skin is warm and dry  Capillary Refill: Capillary refill takes less than 2 seconds  Neurological:      General: No focal deficit present  Mental Status: He is alert and oriented to person, place, and time  Mental status is at baseline  Cranial Nerves: No cranial nerve deficit     Psychiatric:         Mood and Affect: Mood normal         Patient medically optimized for upcoming surgery    MD Darcie Pastor [phone] [address]

## 2023-10-18 ENCOUNTER — APPOINTMENT (OUTPATIENT)
Dept: LAB | Facility: HOSPITAL | Age: 38
End: 2023-10-18
Payer: COMMERCIAL

## 2023-10-18 DIAGNOSIS — Z12.5 SCREENING FOR PROSTATE CANCER: ICD-10-CM

## 2023-10-18 DIAGNOSIS — Z01.818 PRE-OP EXAM: ICD-10-CM

## 2023-10-18 DIAGNOSIS — Z13.6 SCREENING FOR CARDIOVASCULAR CONDITION: ICD-10-CM

## 2023-10-18 DIAGNOSIS — Z13.1 SCREENING FOR DIABETES MELLITUS: ICD-10-CM

## 2023-10-18 DIAGNOSIS — Z00.00 ANNUAL PHYSICAL EXAM: ICD-10-CM

## 2023-10-18 LAB
ALBUMIN SERPL BCP-MCNC: 4 G/DL (ref 3.5–5)
ALP SERPL-CCNC: 25 U/L (ref 34–104)
ALT SERPL W P-5'-P-CCNC: 47 U/L (ref 7–52)
ANION GAP SERPL CALCULATED.3IONS-SCNC: 5 MMOL/L
APTT PPP: 27 SECONDS (ref 23–37)
AST SERPL W P-5'-P-CCNC: 34 U/L (ref 13–39)
BASOPHILS # BLD AUTO: 0.02 THOUSANDS/ÂΜL (ref 0–0.1)
BASOPHILS NFR BLD AUTO: 1 % (ref 0–1)
BILIRUB SERPL-MCNC: 1.04 MG/DL (ref 0.2–1)
BUN SERPL-MCNC: 13 MG/DL (ref 5–25)
CALCIUM SERPL-MCNC: 9 MG/DL (ref 8.4–10.2)
CHLORIDE SERPL-SCNC: 105 MMOL/L (ref 96–108)
CHOLEST SERPL-MCNC: 154 MG/DL
CO2 SERPL-SCNC: 29 MMOL/L (ref 21–32)
CREAT SERPL-MCNC: 1.43 MG/DL (ref 0.6–1.3)
EOSINOPHIL # BLD AUTO: 0.06 THOUSAND/ÂΜL (ref 0–0.61)
EOSINOPHIL NFR BLD AUTO: 2 % (ref 0–6)
ERYTHROCYTE [DISTWIDTH] IN BLOOD BY AUTOMATED COUNT: 13.8 % (ref 11.6–15.1)
EST. AVERAGE GLUCOSE BLD GHB EST-MCNC: 97 MG/DL
GFR SERPL CREATININE-BSD FRML MDRD: 61 ML/MIN/1.73SQ M
GLUCOSE P FAST SERPL-MCNC: 88 MG/DL (ref 65–99)
HBA1C MFR BLD: 5 %
HCT VFR BLD AUTO: 43.1 % (ref 36.5–49.3)
HDLC SERPL-MCNC: 26 MG/DL
HGB BLD-MCNC: 14.9 G/DL (ref 12–17)
IMM GRANULOCYTES # BLD AUTO: 0.01 THOUSAND/UL (ref 0–0.2)
IMM GRANULOCYTES NFR BLD AUTO: 0 % (ref 0–2)
INR PPP: 1.06 (ref 0.84–1.19)
LDLC SERPL CALC-MCNC: 118 MG/DL (ref 0–100)
LYMPHOCYTES # BLD AUTO: 1.65 THOUSANDS/ÂΜL (ref 0.6–4.47)
LYMPHOCYTES NFR BLD AUTO: 41 % (ref 14–44)
MCH RBC QN AUTO: 31.7 PG (ref 26.8–34.3)
MCHC RBC AUTO-ENTMCNC: 34.6 G/DL (ref 31.4–37.4)
MCV RBC AUTO: 92 FL (ref 82–98)
MONOCYTES # BLD AUTO: 0.49 THOUSAND/ÂΜL (ref 0.17–1.22)
MONOCYTES NFR BLD AUTO: 12 % (ref 4–12)
NEUTROPHILS # BLD AUTO: 1.76 THOUSANDS/ÂΜL (ref 1.85–7.62)
NEUTS SEG NFR BLD AUTO: 44 % (ref 43–75)
NONHDLC SERPL-MCNC: 128 MG/DL
NRBC BLD AUTO-RTO: 0 /100 WBCS
PLATELET # BLD AUTO: 265 THOUSANDS/UL (ref 149–390)
PMV BLD AUTO: 9.5 FL (ref 8.9–12.7)
POTASSIUM SERPL-SCNC: 4.2 MMOL/L (ref 3.5–5.3)
PROT SERPL-MCNC: 7.1 G/DL (ref 6.4–8.4)
PROTHROMBIN TIME: 14.4 SECONDS (ref 11.6–14.5)
PSA SERPL-MCNC: 0.71 NG/ML (ref 0–4)
RBC # BLD AUTO: 4.7 MILLION/UL (ref 3.88–5.62)
SODIUM SERPL-SCNC: 139 MMOL/L (ref 135–147)
TRIGL SERPL-MCNC: 48 MG/DL
TSH SERPL DL<=0.05 MIU/L-ACNC: 2.28 UIU/ML (ref 0.45–4.5)
WBC # BLD AUTO: 3.99 THOUSAND/UL (ref 4.31–10.16)

## 2023-10-18 PROCEDURE — 84443 ASSAY THYROID STIM HORMONE: CPT

## 2023-10-18 PROCEDURE — 85025 COMPLETE CBC W/AUTO DIFF WBC: CPT

## 2023-10-18 PROCEDURE — 36415 COLL VENOUS BLD VENIPUNCTURE: CPT

## 2023-10-18 PROCEDURE — 85610 PROTHROMBIN TIME: CPT

## 2023-10-18 PROCEDURE — 83036 HEMOGLOBIN GLYCOSYLATED A1C: CPT

## 2023-10-18 PROCEDURE — G0103 PSA SCREENING: HCPCS

## 2023-10-18 PROCEDURE — 85730 THROMBOPLASTIN TIME PARTIAL: CPT

## 2023-10-18 PROCEDURE — 80061 LIPID PANEL: CPT

## 2023-10-18 PROCEDURE — 80053 COMPREHEN METABOLIC PANEL: CPT

## 2024-01-10 ENCOUNTER — OFFICE VISIT (OUTPATIENT)
Dept: FAMILY MEDICINE CLINIC | Facility: CLINIC | Age: 39
End: 2024-01-10
Payer: COMMERCIAL

## 2024-01-10 VITALS
HEIGHT: 71 IN | OXYGEN SATURATION: 98 % | HEART RATE: 84 BPM | SYSTOLIC BLOOD PRESSURE: 136 MMHG | TEMPERATURE: 97.6 F | BODY MASS INDEX: 34.86 KG/M2 | DIASTOLIC BLOOD PRESSURE: 76 MMHG | WEIGHT: 249 LBS

## 2024-01-10 DIAGNOSIS — H91.93 BILATERAL CHANGE IN HEARING: Primary | ICD-10-CM

## 2024-01-10 PROCEDURE — 99214 OFFICE O/P EST MOD 30 MIN: CPT | Performed by: STUDENT IN AN ORGANIZED HEALTH CARE EDUCATION/TRAINING PROGRAM

## 2024-01-10 NOTE — PROGRESS NOTES
"Assessment/Plan:         Problem List Items Addressed This Visit    None  Visit Diagnoses     Bilateral change in hearing    -  Primary    Relevant Orders    VAS carotid complete study        Carotid doppler, likely blood pressure related worsened with energy drinks and stress. Work on reducing these, follow up carotid doppler. Lower suspicion for vascular defect vs inner ear anatomical abnormality (schwannoma)    Subjective:      Patient ID: Davide Smith is a 38 y.o. male.    HPI    Hearing \"swoshing\" swounds in ears bilaterally. 1-2x a month it occurs. Noticed if he takes an anergy drink it occurs or if he is stressed it occurs. Does not smoke, no ringing in the ear or loss of hearing    The following portions of the patient's history were reviewed and updated as appropriate:   Past Medical History:  He has a past medical history of Webbed penis (7/8/2021).,  _______________________________________________________________________  Medical Problems:  does not have any pertinent problems on file.,  _______________________________________________________________________  Past Surgical History:   has a past surgical history that includes Vasectomy; Vasectomy (2016); and pr scrotoplasty simple (N/A, 7/1/2021).,  _______________________________________________________________________  Family History:  family history includes Colon cancer in his paternal grandfather; Heart disease in his mother; No Known Problems in his daughter, daughter, father, paternal grandmother, and son.,  _______________________________________________________________________  Social History:   reports that he quit smoking about 2 years ago. His smoking use included cigarettes. He has never used smokeless tobacco. He reports current alcohol use. He reports that he does not use drugs.,  _______________________________________________________________________  Allergies:  has No Known " "Allergies..  _______________________________________________________________________  Current Outpatient Medications   Medication Sig Dispense Refill   • b complex vitamins tablet Take 1 tablet by mouth daily     • multivitamin (THERAGRAN) TABS Take 1 tablet by mouth daily       No current facility-administered medications for this visit.     _______________________________________________________________________  Review of Systems   Constitutional:  Negative for activity change, appetite change, fatigue and fever.   HENT:  Negative for congestion, hearing loss, rhinorrhea and sore throat.    Eyes:  Negative for visual disturbance.   Respiratory:  Negative for cough and shortness of breath.    Cardiovascular:  Negative for chest pain.   Gastrointestinal:  Negative for nausea and vomiting.         Objective:  Vitals:    01/10/24 1307   BP: 136/76   Pulse: 84   Temp: 97.6 °F (36.4 °C)   SpO2: 98%   Weight: 113 kg (249 lb)   Height: 5' 11\" (1.803 m)     Body mass index is 34.73 kg/m².     Physical Exam  Constitutional:       General: He is not in acute distress.     Appearance: Normal appearance.   HENT:      Head: Normocephalic and atraumatic.      Right Ear: Hearing, tympanic membrane, ear canal and external ear normal.      Left Ear: Hearing, tympanic membrane, ear canal and external ear normal.      Ears:      Kaur exam findings: Does not lateralize.  Pulmonary:      Effort: Pulmonary effort is normal. No respiratory distress.   Neurological:      Mental Status: He is alert and oriented to person, place, and time. Mental status is at baseline.   Psychiatric:         Mood and Affect: Mood normal.         Behavior: Behavior normal.         "

## 2024-01-17 ENCOUNTER — TELEPHONE (OUTPATIENT)
Age: 39
End: 2024-01-17

## 2024-01-17 NOTE — TELEPHONE ENCOUNTER
----- Message from Aimee Miranda sent at 7/10/2023 10:15 AM CDT -----  Regarding: FW: ORDER/REFERRAL    ----- Message -----  From: Alicja Simon LPN  Sent: 7/10/2023  10:11 AM CDT  To: [facility] [address] [place] [address]  Subject: FW: ORDER/REFERRAL                               Please assist with referral  ----- Message -----  From: Rhonda Craven  Sent: 7/10/2023   9:57 AM CDT  To: Alicja Simon LPN  Subject: RE: ORDER/REFERRAL                               Return follow up visit for Special screening for malignant neoplasms, colon    Liver lesion    Elevated LFTs    ----- Message -----  From: Alicja Simon LPN  Sent: 7/10/2023   9:53 AM CDT  To: Rhonda Craven  Subject: FW: ORDER/REFERRAL                               What is the diagnosis for the visit?  ----- Message -----  From: Rhonda Craven  Sent: 7/10/2023   8:21 AM CDT  To: #  Subject: ORDER/REFERRAL                                   Patient has an appointment with  on 7/11/23. Can we get an updated order/referral for this visit? Thank you    Ale Craven             NO FURTHER ACTION NEEDED SINCE CLINICAL SENT TO CLERICAL BECAUSE AS NOTED BELOW PTS WANTS TO MAKE HIS OWN APPT ON [employer].

## 2024-01-17 NOTE — TELEPHONE ENCOUNTER
Patient called the office this AM in attempt to reschedule his 1/17 office visit in [place] with Dr. Lomas.    Pt self scheduled using the [employer] karina.     Pt wanted to see if he could reschedule to [place] due to the distance.    Attempts were made to reschedule with MD but next avail was in April.    Per decision tree--I was going to warm transfer the patient to triage for further eval and patient declined.    Stating that it wasn't an urgent matter.    Pt declined being transferred to triage and stated that he'd self reschedule using [employer].    Message is being routed to office for review.

## 2024-01-30 ENCOUNTER — TELEPHONE (OUTPATIENT)
Dept: FAMILY MEDICINE CLINIC | Facility: CLINIC | Age: 39
End: 2024-01-30

## 2024-02-01 ENCOUNTER — OFFICE VISIT (OUTPATIENT)
Dept: FAMILY MEDICINE CLINIC | Facility: CLINIC | Age: 39
End: 2024-02-01
Payer: COMMERCIAL

## 2024-02-01 VITALS
WEIGHT: 247.6 LBS | HEIGHT: 71 IN | HEART RATE: 69 BPM | DIASTOLIC BLOOD PRESSURE: 72 MMHG | BODY MASS INDEX: 34.66 KG/M2 | OXYGEN SATURATION: 98 % | TEMPERATURE: 98.1 F | SYSTOLIC BLOOD PRESSURE: 120 MMHG

## 2024-02-01 DIAGNOSIS — M54.50 LUMBAR BACK PAIN: Primary | ICD-10-CM

## 2024-02-01 PROCEDURE — 99214 OFFICE O/P EST MOD 30 MIN: CPT | Performed by: STUDENT IN AN ORGANIZED HEALTH CARE EDUCATION/TRAINING PROGRAM

## 2024-02-01 RX ORDER — LIDOCAINE 50 MG/G
1 PATCH TOPICAL DAILY
Qty: 15 PATCH | Refills: 0 | Status: SHIPPED | OUTPATIENT
Start: 2024-02-01

## 2024-02-01 NOTE — PROGRESS NOTES
Assessment/Plan:         Problem List Items Addressed This Visit    None  Visit Diagnoses     Lumbar back pain    -  Primary    Relevant Medications    lidocaine (Lidoderm) 5 %    diclofenac sodium (VOLTAREN) 50 mg EC tablet        Reviewed ER note and imaging, anti inflammatory    Subjective:      Patient ID: Davide Smith is a 38 y.o. male.    Fall  Pertinent negatives include no fever, nausea or vomiting.       Dropped kids off at bus, came back tot he apartment and on a loose step it gave way when and fell down4-5 steps (the 2nd step from the top broke when he stepped on it going downward). When standing up, heard a pop. Pain improved 6/10 and tuesay an 8/10. No numbness or tingling in the leg, difficulties with using the bathroom. Not taking any medications for it    The following portions of the patient's history were reviewed and updated as appropriate:   Past Medical History:  He has a past medical history of Webbed penis (7/8/2021).,  _______________________________________________________________________  Medical Problems:  does not have any pertinent problems on file.,  _______________________________________________________________________  Past Surgical History:   has a past surgical history that includes Vasectomy; Vasectomy (2016); and pr scrotoplasty simple (N/A, 7/1/2021).,  _______________________________________________________________________  Family History:  family history includes Colon cancer in his paternal grandfather; Heart disease in his mother; No Known Problems in his daughter, daughter, father, paternal grandmother, and son.,  _______________________________________________________________________  Social History:   reports that he quit smoking about 2 years ago. His smoking use included cigarettes. He has never used smokeless tobacco. He reports current alcohol use. He reports that he does not use  "drugs.,  _______________________________________________________________________  Allergies:  has No Known Allergies..  _______________________________________________________________________  Current Outpatient Medications   Medication Sig Dispense Refill   • b complex vitamins tablet Take 1 tablet by mouth daily     • diclofenac sodium (VOLTAREN) 50 mg EC tablet Take 1 tablet (50 mg total) by mouth 2 (two) times a day 56 tablet 0   • lidocaine (Lidoderm) 5 % Apply 1 patch topically over 12 hours daily Remove & Discard patch within 12 hours or as directed by MD 15 patch 0   • multivitamin (THERAGRAN) TABS Take 1 tablet by mouth daily       No current facility-administered medications for this visit.     _______________________________________________________________________  Review of Systems   Constitutional:  Negative for activity change, appetite change, fatigue and fever.   HENT:  Negative for congestion, rhinorrhea and sore throat.    Eyes:  Negative for visual disturbance.   Respiratory:  Negative for cough and shortness of breath.    Cardiovascular:  Negative for chest pain.   Gastrointestinal:  Negative for nausea and vomiting.   Musculoskeletal:  Positive for back pain.         Objective:  Vitals:    02/01/24 1116   BP: 120/72   BP Location: Left arm   Patient Position: Sitting   Cuff Size: Large   Pulse: 69   Temp: 98.1 °F (36.7 °C)   TempSrc: Tympanic   SpO2: 98%   Weight: 112 kg (247 lb 9.6 oz)   Height: 5' 11\" (1.803 m)     Body mass index is 34.53 kg/m².     Physical Exam  Constitutional:       General: He is not in acute distress.     Appearance: Normal appearance.   HENT:      Head: Normocephalic and atraumatic.   Pulmonary:      Effort: Pulmonary effort is normal. No respiratory distress.   Musculoskeletal:      Lumbar back: Tenderness present. No bony tenderness. Normal range of motion. Negative right straight leg raise test and negative left straight leg raise test.   Neurological:      Mental " Status: He is alert and oriented to person, place, and time. Mental status is at baseline.   Psychiatric:         Mood and Affect: Mood normal.         Behavior: Behavior normal.

## 2024-02-04 ENCOUNTER — HOSPITAL ENCOUNTER (EMERGENCY)
Facility: HOSPITAL | Age: 39
Discharge: HOME | End: 2024-02-04
Attending: EMERGENCY MEDICINE
Payer: COMMERCIAL

## 2024-02-04 VITALS
WEIGHT: 243 LBS | DIASTOLIC BLOOD PRESSURE: 78 MMHG | BODY MASS INDEX: 34.02 KG/M2 | TEMPERATURE: 99.2 F | OXYGEN SATURATION: 98 % | RESPIRATION RATE: 18 BRPM | HEIGHT: 71 IN | HEART RATE: 98 BPM | SYSTOLIC BLOOD PRESSURE: 134 MMHG

## 2024-02-04 DIAGNOSIS — M54.50 ACUTE LOW BACK PAIN: Primary | ICD-10-CM

## 2024-02-04 PROCEDURE — 99283 EMERGENCY DEPT VISIT LOW MDM: CPT

## 2024-02-04 PROCEDURE — 99284 EMERGENCY DEPT VISIT MOD MDM: CPT | Performed by: PHYSICIAN ASSISTANT

## 2024-02-04 RX ORDER — PREDNISONE 20 MG/1
40 TABLET ORAL DAILY
Qty: 10 TABLET | Refills: 0 | Status: SHIPPED | OUTPATIENT
Start: 2024-02-04

## 2024-02-04 NOTE — DISCHARGE INSTRUCTIONS
Topical Lidoderm patches, 12 hours on/12 hours off.  Tylenol Motrin as needed for pain management.  Printed note any as directed.  Outpatient comprehensive spine program as discussed for further management.  Return to the ED with any new or worsening symptoms as discussed.

## 2024-02-04 NOTE — ED PROVIDER NOTES
History  Chief Complaint   Patient presents with    Back Pain     States he fell on January 30th and injured his lower back, c/o increasing pain.      Davide Smith is a 38-year-old male arriving ambulatory to the emergency department for evaluation of persistent low back pain in the setting of a mechanical fall on 1/30.  Patient tripped down the stairs outside of his apartment, striking his low back against a concrete surface and falling down 4 stairs.  Negative head strike or LOC, not on blood thinners.  He was previously evaluated at [facility] at which time CT of the lumbar spine was performed, reporting no acute osseous abnormality.  He continues with pain in the lower back without radiation along either lower extremity.  He reports over the weekend he developed a localized area of numbness to the anterior right thigh. He denies any lower extremity weakness or difficulty ambulating.  He denies saddle anesthesia, bladder/bowel incontinence or retention.  He denies any recurrent injury or trauma.  He has no history of diabetes or other immunosuppressed conditions. He has no surgical history of the lumbar spine, and no history of IV drug use. He offers no other complaints or concerns at this time.         Prior to Admission Medications   Prescriptions Last Dose Informant Patient Reported? Taking?   b complex vitamins tablet  Self Yes No   Sig: Take 1 tablet by mouth daily   diclofenac sodium (VOLTAREN) 50 mg EC tablet   No No   Sig: Take 1 tablet (50 mg total) by mouth 2 (two) times a day   lidocaine (Lidoderm) 5 %   No No   Sig: Apply 1 patch topically over 12 hours daily Remove & Discard patch within 12 hours or as directed by MD   multivitamin (THERAGRAN) TABS  Self Yes No   Sig: Take 1 tablet by mouth daily      Facility-Administered Medications: None       Past Medical History:   Diagnosis Date    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       Past Surgical History:   Procedure Laterality Date    DC SCROTOPLASTY  SIMPLE N/A 2021    Procedure: SCROTOPLASTY;  Surgeon: Oli Lomas MD;  Location: [facility];  Service: Urology    VASECTOMY      VASECTOMY  2016       Family History   Problem Relation Age of Onset    Heart disease Mother     No Known Problems Father     No Known Problems Daughter     No Known Problems Daughter     No Known Problems Paternal Grandmother     Colon cancer Paternal Grandfather     No Known Problems Son     Breast cancer Neg Hx      I have reviewed and agree with the history as documented.    E-Cigarette/Vaping    E-Cigarette Use Never User      E-Cigarette/Vaping Substances     Social History     Tobacco Use    Smoking status: Former     Current packs/day: 0.00     Types: Cigarettes     Quit date: 2021     Years since quittin.8    Smokeless tobacco: Never   Vaping Use    Vaping status: Never Used   Substance Use Topics    Alcohol use: Yes     Comment: social 3x a year    Drug use: Never       Review of Systems   Constitutional:  Negative for chills, diaphoresis and fever.   Respiratory:  Negative for shortness of breath.    Cardiovascular:  Negative for chest pain.   Gastrointestinal:  Negative for abdominal pain, nausea and vomiting.   Musculoskeletal:  Positive for back pain. Negative for neck pain.   All other systems reviewed and are negative.      Physical Exam  Physical Exam  Vitals and nursing note reviewed.   Constitutional:       General: He is not in acute distress.     Appearance: Normal appearance. He is well-developed. He is not ill-appearing, toxic-appearing or diaphoretic.   HENT:      Head: Normocephalic and atraumatic.      Right Ear: External ear normal.      Left Ear: External ear normal.   Eyes:      Conjunctiva/sclera: Conjunctivae normal.   Cardiovascular:      Rate and Rhythm: Normal rate and regular rhythm.      Pulses: Normal pulses.   Pulmonary:      Effort: Pulmonary effort is normal. No respiratory distress.      Breath sounds: Normal breath sounds. No  wheezing, rhonchi or rales.   Chest:      Chest wall: No tenderness.   Musculoskeletal:         General: Normal range of motion.      Cervical back: Normal range of motion and neck supple.        Legs:       Comments: Diffuse tenderness upon palpation throughout the lumbosacral region.  No point tenderness, bony deformity or step-offs of the lumbar spine.  Remainder of sensation is intact with palpation along both lower extremities, with symmetric 5 of 5 strength.  Patient ambulatory in the department with steady gait observed.   Skin:     General: Skin is warm and dry.      Capillary Refill: Capillary refill takes less than 2 seconds.   Neurological:      Mental Status: He is alert.      Motor: Motor function is intact. No weakness.      Gait: Gait is intact.   Psychiatric:         Mood and Affect: Mood normal.         Vital Signs  ED Triage Vitals [02/04/24 0819]   Temperature Pulse Respirations Blood Pressure SpO2   99.2 °F (37.3 °C) 98 18 134/78 98 %      Temp Source Heart Rate Source Patient Position - Orthostatic VS BP Location FiO2 (%)   Temporal Monitor Sitting Left arm --      Pain Score       --           Vitals:    02/04/24 0819   BP: 134/78   Pulse: 98   Patient Position - Orthostatic VS: Sitting         Visual Acuity  Visual Acuity      Flowsheet Row Most Recent Value   L Pupil Size (mm) 3   R Pupil Size (mm) 3            ED Medications  Medications - No data to display    Diagnostic Studies  Results Reviewed       None                   No orders to display              Procedures  Procedures         ED Course                               SBIRT 20yo+      Flowsheet Row Most Recent Value   Initial Alcohol Screen: US AUDIT-C     1. How often do you have a drink containing alcohol? 0 Filed at: 02/04/2024 0820   2. How many drinks containing alcohol do you have on a typical day you are drinking?  0 Filed at: 02/04/2024 0820   3a. Male UNDER 65: How often do you have five or more drinks on one occasion? 0  Filed at: 02/04/2024 0820   3b. FEMALE Any Age, or MALE 65+: How often do you have 4 or more drinks on one occassion? 0 Filed at: 02/04/2024 0820   Audit-C Score 0 Filed at: 02/04/2024 0820   RODO: How many times in the past year have you...    Used an illegal drug or used a prescription medication for non-medical reasons? Never Filed at: 02/04/2024 0820                      Medical Decision Making  MDM: This is a 38-year-old male arriving ambulatory to the emergency department today for persistent low back pain secondary to mechanical fall on 1/30.  Patient reports that he had slipped and struck his back against a concrete stairs and had fallen down four stairs in total.  He was previously evaluated at [facility] on 1/30.  The patient thought that an x-ray of the lumbar spine was performed at that time however he had a CT which reported no acute osseous abnormality.  He denies any recurrent trauma.  He has no back pain red flags.  His vital signs are stable on presentation, with examination as above. Low clinical suspicion for cauda equina, critical spinal stenosis, compression fx, or any neurosurgical emergency. In discussion of risk/benefits/alternatives, do not feel that repeat CT of the lumbar spine is warranted today which the patient is understanding of and agreeable with.  Ambulatory referral to the comprehensive spine program was placed that the patient can continue follow-up outpatient.  Reviewed supportive measures to include topical Lidoderm patches, Tylenol & Motrin for pain relief, prednisone burst.  Parameters for ED return reviewed in detail.  Patient discharged in stable condition, ambulating independently from the emergency department today without issue.    Amount and/or Complexity of Data Reviewed  External Data Reviewed: radiology and notes.    Risk  OTC drugs.  Prescription drug management.             Disposition  Final diagnoses:   Acute low back pain     Time reflects when  diagnosis was documented in both MDM as applicable and the Disposition within this note       Time User Action Codes Description Comment    2/4/2024  8:51 AM Liza Coronel [M54.50] Acute low back pain           ED Disposition       ED Disposition   Discharge    Condition   Stable    Date/Time   Sun Feb 4, 2024 0847    Comment   Davide Smith discharge to home/self care.                   Follow-up Information       Follow up With Specialties Details Why Contact Info Additional Information    Valerie Bustamante MD Family Medicine   [address]  [address]  [place] [address]  [phone]       [facility] Physical Therapy   [phone]    [facility] Emergency Medicine  If symptoms worsen [address] [facility] [address] [phone] [facility], [address] [place], [place], [address]            Discharge Medication List as of 2/4/2024  8:52 AM        START taking these medications    Details   predniSONE 20 mg tablet Take 2 tablets (40 mg total) by mouth daily, Starting Sun 2/4/2024, Normal           CONTINUE these medications which have NOT CHANGED    Details   b complex vitamins tablet Take 1 tablet by mouth daily, Historical Med      diclofenac sodium (VOLTAREN) 50 mg EC tablet Take 1 tablet (50 mg total) by mouth 2 (two) times a day, Starting Thu 2/1/2024, Normal      lidocaine (Lidoderm) 5 % Apply 1 patch topically over 12 hours daily Remove & Discard patch within 12 hours or as directed by MD, Starting Thu 2/1/2024, Normal      multivitamin (THERAGRAN) TABS Take 1 tablet by mouth daily, Historical Med                 PDMP Review       None            ED Provider  Electronically Signed by             Liza Coronel PA-C  02/04/24 0817

## 2024-02-05 ENCOUNTER — TELEPHONE (OUTPATIENT)
Dept: FAMILY MEDICINE CLINIC | Facility: CLINIC | Age: 39
End: 2024-02-05

## 2024-02-05 DIAGNOSIS — M54.50 LUMBAR BACK PAIN: Primary | ICD-10-CM

## 2024-02-05 NOTE — TELEPHONE ENCOUNTER
T/c from pt -- reports he was seen in the ED yesterday for continuing back pain -- a referral was placed for the Comprehensive Sp[facility] Program for the pt, however, when he called the p# listed to schedule (call center) he was told they could not see the order in the computer.    Order is in pts chart.      Printing for him will not help, as he has to schedule through the call center and can not take the referral into an office to schedule, showing he does have a referral.    Is it possible for Dr Bustamante to re-order the referral for pt, in hopes that having it reordered will make it visibile to the schedulers?

## 2024-02-06 ENCOUNTER — NURSE TRIAGE (OUTPATIENT)
Dept: PHYSICAL THERAPY | Facility: OTHER | Age: 39
End: 2024-02-06

## 2024-02-06 DIAGNOSIS — M54.41 ACUTE BILATERAL LOW BACK PAIN WITH RIGHT-SIDED SCIATICA: Primary | ICD-10-CM

## 2024-02-06 NOTE — TELEPHONE ENCOUNTER
Additional Information   Negative: Is this related to a work injury?   Negative: Is this related to an MVA?   Negative: Are you currently recieving homecare services?   Negative: Has the patient had unexplained weight loss?   Negative: Does the patient have a fever?   Negative: Is the patient experiencing urine retention?   Negative: Is the patient experiencing acute drop foot or paralysis?   Negative: Has the patient experienced major trauma? (fall from height, high speed collision, direct blow to spine) and is also experiencing nausea, light-headedness, or loss of consciousness?   Negative: Is the patient experiencing blood in sputum?   Negative: Is this a chronic condition?    Background - Initial Assessment  Clinical complaint: bilateral lower back pain. States numbness of the right leg. States he fell down some steps on 1/30/24. States her had a discectomy 2021. States his back had been fine unitl this episode.  Date of onset: 1/30/24  Frequency of pain: constant  Quality of pain: aching, sharp, shooting, stabbing, and throbbing    Protocols used: [facility] Protocol    This RN did review in detail the Comprehensive Sp[facility] Program and what we can provide for their back pain.  Patient is agreeable to being triaged by this RN and would like to proceed with Physical Therapy.    Referral was placed for Physical Therapy at the Call site. Patients information was sent to the  to make evaluation appointment. Patient made aware that the PT office  will be calling to schedule the appointment.  Patient was provided with the phone number to the PT office.    No further questions and/or concerns were voiced by the patient at this time. Patient states understanding of the referral that was placed.    Referral Closed.

## 2024-02-09 ENCOUNTER — EVALUATION (OUTPATIENT)
Dept: PHYSICAL THERAPY | Facility: CLINIC | Age: 39
End: 2024-02-09
Payer: COMMERCIAL

## 2024-02-09 DIAGNOSIS — M54.41 ACUTE BILATERAL LOW BACK PAIN WITH RIGHT-SIDED SCIATICA: ICD-10-CM

## 2024-02-09 PROCEDURE — 97161 PT EVAL LOW COMPLEX 20 MIN: CPT | Performed by: PHYSICAL THERAPIST

## 2024-02-09 NOTE — PROGRESS NOTES
PT Evaluation     Today's date: 2024  Patient name: Davide Smith  : 1985  MRN: [id number]  Referring provider: Valerie Bustamante MD  Dx:   Encounter Diagnosis     ICD-10-CM    1. Acute bilateral low back pain with right-sided sciatica  M54.41 Ambulatory referral to PT spine                     Assessment  Assessment details: Pt is a 39 y/o male who presents to physical therapy with primary peripheral neuropathic pain associated with subacute LBP with directional preference for extension complicated by previous low back injuries. Pt does not present with any red flag symptoms at this time. Pt presents with reduced and painful lumbar ROM, (+) nerve root signs, and reduced core activation. Education provided regarding POC, prognosis and HEP, pt verablized understanding. Pt would benefit from skilled physical therapy in order to decrease deficits and return to prior level of function.    Impairments: abnormal gait, abnormal or restricted ROM, activity intolerance, impaired physical strength and pain with function  Understanding of Dx/Px/POC: good  Goals  STG( 4 weeks):  Pt will be independent with basic HEP.  Pt will demonstrate increase in lumbar flexion ROM to minimal restriction.      LTG (6 weeks):  FOTO will be expected outcome.   Pt will demonstrate non-painful lumbar flexion.  Pt will demonstrate MMT grade comparable to contralateral limb in all deficient planes.  Pt will report ability to tolerate sitting for 1 hour without increase in symptoms.  Pt will be independent with extensive HEP.      Plan  Patient would benefit from: skilled physical therapy  Planned modality interventions: cryotherapy  Planned therapy interventions: manual therapy, neuromuscular re-education, patient education, self care, strengthening, stretching, therapeutic activities, therapeutic exercise and home exercise program  Frequency: 1-2x/week.  Duration in weeks: 6  Treatment plan discussed with: patient      Subjective  "Evaluation    History of Present Illness  Mechanism of injury: Chief Complaint: Pt reports slipping on step and falling and landing in a sitting position on 1/30/24. Pt reports that when he went to get up from the the ground he felt an immediate pop in his back and gradual onset of pain. He states that CT scan was negative. He reports that his pain is minimally improved from this date despite doing some exercises for his back from previous PT with minimal change.     Severity: severe  Irritability: moderate  Nature: nociceptive and peripheral neuropathic  Stage: subacute  Stability: slight improvement    P1: see body chart  Patient Goals  Patient goal: \"alleviate the pain\"      Objective     Postural Observations    Additional Postural Observation Details  Slight increase in L skin fold under the 12th rib, unable to see a lateral shift  Potential L LE longer    Neurological Testing     Reflexes   Left   Patellar (L4): trace (1+)  Achilles (S1): trace (1+)    Right   Patellar (L4): absent (0)  Achilles (S1): trace (1+)    Additional Neurological Details  Dermatome- reduced R L3  Myotome- reduced L1-3    (-) SLR  (+) femoral nerve stretch on the R      Active Range of Motion     Additional Active Range of Motion Details  FB: 40% with lateral deviation to the L and large increase in P1  BB: 50% with slight increase in P1  R SB: 75% with slight increase in P1  L SB: 75% with stretch increase in P1      Joint Play     Pain: L4 and L5     Muscle Activation     Additional Muscle Activation Details  Good activation upon command, however, lack of activation during transitions wich increased pain    General Comments:    Lower quarter screen   Hips: unremarkable           Precautions: None  POC expires Unit limit Auth Expiration date PT/OT/ST + Visit Limit?   3/22/24 BOMN After 24th BOMN                           Visit/Unit Tracking  AUTH Status:  Date 2/9              After 24th Used 1               Remaining              "           Manuals 2/9            Assess PIT             Femoral nerve slider                                       Neuro Re-Ed             TrA activation in hooklying BEV            TrA heel slide 1x5 HEP            TrA progression             Bird dog                                                    Ther Ex             Prone prop 1x1'            PPU 1x10 HEP                                                                             Pt edu Sitting posture, pathoanatomy            Ther Activity                                       Gait Training                                       Modalities

## 2024-02-15 ENCOUNTER — OFFICE VISIT (OUTPATIENT)
Dept: PHYSICAL THERAPY | Facility: CLINIC | Age: 39
End: 2024-02-15
Payer: COMMERCIAL

## 2024-02-15 DIAGNOSIS — M54.41 ACUTE BILATERAL LOW BACK PAIN WITH RIGHT-SIDED SCIATICA: Primary | ICD-10-CM

## 2024-02-15 PROCEDURE — 97112 NEUROMUSCULAR REEDUCATION: CPT | Performed by: PHYSICAL THERAPIST

## 2024-02-15 PROCEDURE — 97140 MANUAL THERAPY 1/> REGIONS: CPT | Performed by: PHYSICAL THERAPIST

## 2024-02-15 NOTE — PROGRESS NOTES
"Daily Note     Today's date: 2/15/2024  Patient name: Davide Smith  : 1985  MRN: [id number]  Referring provider: Valerie Bustamante MD  Dx:   Encounter Diagnosis     ICD-10-CM    1. Acute bilateral low back pain with right-sided sciatica  M54.41                      Subjective: Pt reports minimal improvement since IE. Still painful with sitting.       Objective: See treatment diary below    (-) SLR, femoral nerve tension test  FB- painful       Assessment: Tolerated treatment well. Pt fits under manip classification (<16 days, no symptoms below the knee). Lumbar sidelying pre manip hold reduced symptoms. Manip performed, improved flexion ROM with less pain. Followed up with motor control exercise. Patient would benefit from continued PT      Plan: Continue per plan of care.  Progress treatment as tolerated.       Precautions: None  POC expires Unit limit Auth Expiration date PT/OT/ST + Visit Limit?   3/22/24 BOMN After  BOMN                           Visit/Unit Tracking  AUTH Status:  Date               After  Used 1               Remaining                        Manuals 2/9 2/15           Assess PIT             Femoral nerve slider             Lumbar sidelying gapping   BEV gd 3 1x1'; 1x gd 5                        Neuro Re-Ed             TrA activation in hooklying BEV 10x10\"           TrA heel slide 1x5 HEP 1x10            TrA progression             Bird dog                                                    Ther Ex             Prone prop 1x1'            PPU 1x10 HEP 1x10                                                                            Pt edu Sitting posture, pathoanatomy            Ther Activity                                       Gait Training                                       Modalities                                            "

## 2024-02-22 ENCOUNTER — OFFICE VISIT (OUTPATIENT)
Dept: PHYSICAL THERAPY | Facility: CLINIC | Age: 39
End: 2024-02-22
Payer: COMMERCIAL

## 2024-02-22 DIAGNOSIS — M54.41 ACUTE BILATERAL LOW BACK PAIN WITH RIGHT-SIDED SCIATICA: Primary | ICD-10-CM

## 2024-02-22 PROCEDURE — 97112 NEUROMUSCULAR REEDUCATION: CPT | Performed by: PHYSICAL THERAPIST

## 2024-02-22 PROCEDURE — 97140 MANUAL THERAPY 1/> REGIONS: CPT | Performed by: PHYSICAL THERAPIST

## 2024-02-22 NOTE — PROGRESS NOTES
"Daily Note     Today's date: 2024  Patient name: Davide Smith  : 1985  MRN: [id number]  Referring provider: Valerie Bustamante MD  Dx:   Encounter Diagnosis     ICD-10-CM    1. Acute bilateral low back pain with right-sided sciatica  M54.41                    Subjective: Pt reports he feels ~50% improvement since IE.       Objective: See treatment diary below      Assessment: Tolerated treatment well. Tolerated HVLA well. Followed up with repeated extension with good improvement in lumbar spine flexion following. Femoral nerve sliders implemented today with HEP addition as well. Pt reported feeling good with lumbar flexion following femoral nerve sliders. Patient would benefit from continued PT      Plan: Continue per plan of care.  Progress treatment as tolerated.       Precautions: None  POC expires Unit limit Auth Expiration date PT/OT/ST + Visit Limit?   3/22/24 BOMN After  BOMN                           Visit/Unit Tracking  AUTH Status:  Date               After  Used 1               Remaining                        Manuals 2/9 2/15 2/22          Assess PIT             Femoral nerve slider             Lumbar sidelying gapping   BEV gd 3 1x1'; 1x gd 5 BEV gd 5 1x ea          Manual femoral nerve slider   Sidelying BEV- 5'          Neuro Re-Ed             TrA activation in hooklying BEV 10x10\"           TrA heel slide 1x5 HEP 1x10            TrA progression             Bird dog             Femoral nerve slider   Prone- 20x                                    Ther Ex             Prone prop 1x1'            PPU 1x10 HEP 1x10                                                                            Pt edu Sitting posture, pathoanatomy  BEV          Ther Activity                                       Gait Training                                       Modalities                                              "

## 2024-03-01 ENCOUNTER — TELEPHONE (OUTPATIENT)
Dept: FAMILY MEDICINE CLINIC | Facility: CLINIC | Age: 39
End: 2024-03-01

## 2024-03-01 DIAGNOSIS — M54.50 LUMBAR BACK PAIN: Primary | ICD-10-CM

## 2024-03-01 NOTE — TELEPHONE ENCOUNTER
T/c from pt --states he went to PT for a while for his back pain. He was supposed to get a referral for an MRI from them, but one was never placed. Pt is wondering if Dr Bustamante can put in the referral for the MRI so he can schedule it.     Please advise

## 2024-03-14 ENCOUNTER — APPOINTMENT (OUTPATIENT)
Dept: PHYSICAL THERAPY | Facility: CLINIC | Age: 39
End: 2024-03-14
Payer: COMMERCIAL

## 2024-03-18 ENCOUNTER — OFFICE VISIT (OUTPATIENT)
Dept: FAMILY MEDICINE CLINIC | Facility: CLINIC | Age: 39
End: 2024-03-18
Payer: COMMERCIAL

## 2024-03-18 VITALS
BODY MASS INDEX: 35.42 KG/M2 | WEIGHT: 253 LBS | HEIGHT: 71 IN | SYSTOLIC BLOOD PRESSURE: 138 MMHG | DIASTOLIC BLOOD PRESSURE: 84 MMHG | HEART RATE: 89 BPM | OXYGEN SATURATION: 99 % | TEMPERATURE: 98.9 F

## 2024-03-18 DIAGNOSIS — Z13.6 SCREENING FOR CARDIOVASCULAR CONDITION: ICD-10-CM

## 2024-03-18 DIAGNOSIS — M54.50 LUMBAR BACK PAIN: ICD-10-CM

## 2024-03-18 DIAGNOSIS — Z13.1 SCREENING FOR DIABETES MELLITUS: ICD-10-CM

## 2024-03-18 DIAGNOSIS — Z00.00 ANNUAL PHYSICAL EXAM: Primary | ICD-10-CM

## 2024-03-18 PROCEDURE — 99395 PREV VISIT EST AGE 18-39: CPT | Performed by: STUDENT IN AN ORGANIZED HEALTH CARE EDUCATION/TRAINING PROGRAM

## 2024-03-18 NOTE — PROGRESS NOTES
ADULT ANNUAL PHYSICAL  [facility] [address] [facility]    NAME: Davide Smith  AGE: 38 y.o. SEX: male  : 1985     DATE: 3/18/2024     Assessment and Plan:     Problem List Items Addressed This Visit        Other    BMI 35.0-35.9,adult    Relevant Orders    CBC and Platelet    TSH, 3rd generation with Free T4 reflex    Vitamin D 25 hydroxy   Other Visit Diagnoses     Annual physical exam    -  Primary    Screening for diabetes mellitus        Relevant Orders    Comprehensive metabolic panel    CBC and Platelet    Hemoglobin A1C    Screening for cardiovascular condition        Relevant Orders    Lipid panel    CBC and Platelet    Lumbar back pain              Immunizations and preventive care screenings were discussed with patient today. Appropriate education was printed on patient's after visit summary.    Counseling:  Exercise: the importance of regular exercise/physical activity was discussed. Recommend exercise 3-5 times per week for at least 30 minutes.          Return in 1 year (on 3/18/2025).     Chief Complaint:     Chief Complaint   Patient presents with   • Physical Exam     Insurance denied MRI       History of Present Illness:     Adult Annual Physical   Patient here for a comprehensive physical exam. The patient reports no problems.    Diet and Physical Activity  Diet/Nutrition: well balanced diet.   Exercise: walking.      Depression Screening  PHQ-2/9 Depression Screening         General Health  Sleep: sleeps well.   Hearing: normal - bilateral.  Vision: no vision problems and goes for regular eye exams.   Dental: regular dental visits.        Health  History of STDs?: no.     Review of Systems:     Review of Systems   Past Medical History:     Past Medical History:   Diagnosis Date   • Webbed penis 2021      Past Surgical History:     Past Surgical History:   Procedure Laterality Date   • SC SCROTOPLASTY SIMPLE N/A 2021     Procedure: SCROTOPLASTY;  Surgeon: Oli Lomas MD;  Location: [facility] OR;  Service: Urology   • VASECTOMY     • VASECTOMY        Social History:     Social History     Socioeconomic History   • Marital status: Single     Spouse name: None   • Number of children: None   • Years of education: None   • Highest education level: None   Occupational History   • None   Tobacco Use   • Smoking status: Former     Current packs/day: 0.00     Types: Cigarettes     Quit date: 2021     Years since quittin.9   • Smokeless tobacco: Never   Vaping Use   • Vaping status: Never Used   Substance and Sexual Activity   • Alcohol use: Yes     Comment: social 3x a year   • Drug use: Never   • Sexual activity: None   Other Topics Concern   • None   Social History Narrative   • None     Social Determinants of Health     Financial Resource Strain: Not on file   Food Insecurity: Not on file   Transportation Needs: Not on file   Physical Activity: Not on file   Stress: Not on file   Social Connections: Not on file   Intimate Partner Violence: Not on file   Housing Stability: Not on file      Family History:     Family History   Problem Relation Age of Onset   • Heart disease Mother    • No Known Problems Father    • No Known Problems Daughter    • No Known Problems Daughter    • No Known Problems Paternal Grandmother    • Colon cancer Paternal Grandfather    • No Known Problems Son    • Breast cancer Neg Hx       Current Medications:     Current Outpatient Medications   Medication Sig Dispense Refill   • b complex vitamins tablet Take 1 tablet by mouth daily     • multivitamin (THERAGRAN) TABS Take 1 tablet by mouth daily     • diclofenac sodium (VOLTAREN) 50 mg EC tablet Take 1 tablet (50 mg total) by mouth 2 (two) times a day (Patient not taking: Reported on 3/18/2024) 56 tablet 0   • lidocaine (Lidoderm) 5 % Apply 1 patch topically over 12 hours daily Remove & Discard patch within 12 hours or as directed by MD  "(Patient not taking: Reported on 3/18/2024) 15 patch 0   • predniSONE 20 mg tablet Take 2 tablets (40 mg total) by mouth daily (Patient not taking: Reported on 3/18/2024) 10 tablet 0     No current facility-administered medications for this visit.      Allergies:     No Known Allergies   Physical Exam:     /84   Pulse 89   Temp 98.9 °F (37.2 °C)   Ht 5' 11\" (1.803 m)   Wt 115 kg (253 lb)   SpO2 99%   BMI 35.29 kg/m²     Physical Exam     Valerie Bustamante MD   [facility] [address] [place] [address]    "

## 2024-03-18 NOTE — PROGRESS NOTES
ADULT ANNUAL PHYSICAL  [facility] [address] [facility]    NAME: Davide Smith  AGE: 38 y.o. SEX: male  : 1985     DATE: 3/18/2024     Assessment and Plan:     Problem List Items Addressed This Visit        Other    BMI 35.0-35.9,adult    Relevant Orders    CBC and Platelet    TSH, 3rd generation with Free T4 reflex    Vitamin D 25 hydroxy   Other Visit Diagnoses     Annual physical exam    -  Primary    Screening for diabetes mellitus        Relevant Orders    Comprehensive metabolic panel    CBC and Platelet    Hemoglobin A1C    Screening for cardiovascular condition        Relevant Orders    Lipid panel    CBC and Platelet    Lumbar back pain            Going back to PT, will need to complete 6 weeks per insurance requirements. Strength is equal 5/5 in both legs    Immunizations and preventive care screenings were discussed with patient today. Appropriate education was printed on patient's after visit summary.    Counseling:  Exercise: the importance of regular exercise/physical activity was discussed. Recommend exercise 3-5 times per week for at least 30 minutes.          Return in 1 year (on 3/18/2025).     Chief Complaint:     Chief Complaint   Patient presents with   • Physical Exam     Insurance denied MRI       History of Present Illness:     Adult Annual Physical   Patient here for a comprehensive physical exam. The patient reports no problems.    Diet and Physical Activity  Diet/Nutrition: well balanced diet.   Exercise: strength training exercises and 5-7 times a week on average.      Depression Screening  PHQ-2/9 Depression Screening         General Health  Sleep: sleeps well.   Hearing: normal - bilateral.  Vision: no vision problems and goes for regular eye exams.   Dental: regular dental visits.        Health  History of STDs?: no.     Review of Systems:     Review of Systems   Constitutional:  Negative for chills, fatigue and  fever.   HENT:  Negative for rhinorrhea and sore throat.    Eyes:  Negative for visual disturbance.   Respiratory:  Negative for cough and shortness of breath.    Cardiovascular:  Negative for chest pain and palpitations.   Gastrointestinal:  Negative for abdominal pain, constipation, diarrhea, nausea and vomiting.   Genitourinary:  Negative for difficulty urinating, dysuria and frequency.   Musculoskeletal:  Positive for back pain. Negative for arthralgias and myalgias.   Skin:  Negative for color change and rash.   Neurological:  Negative for weakness and headaches.      Past Medical History:     Past Medical History:   Diagnosis Date   • Webbed penis 2021      Past Surgical History:     Past Surgical History:   Procedure Laterality Date   • [place] SCROTOPLASTY SIMPLE N/A 2021    Procedure: SCROTOPLASTY;  Surgeon: Oli Lomas MD;  Location: [facility];  Service: Urology   • VASECTOMY     • VASECTOMY        Social History:     Social History     Socioeconomic History   • Marital status: Single     Spouse name: None   • Number of children: None   • Years of education: None   • Highest education level: None   Occupational History   • None   Tobacco Use   • Smoking status: Former     Current packs/day: 0.00     Types: Cigarettes     Quit date: 2021     Years since quittin.9   • Smokeless tobacco: Never   Vaping Use   • Vaping status: Never Used   Substance and Sexual Activity   • Alcohol use: Yes     Comment: social 3x a year   • Drug use: Never   • Sexual activity: None   Other Topics Concern   • None   Social History Narrative   • None     Social Determinants of Health     Financial Resource Strain: Not on file   Food Insecurity: Not on file   Transportation Needs: Not on file   Physical Activity: Not on file   Stress: Not on file   Social Connections: Not on file   Intimate Partner Violence: Not on file   Housing Stability: Not on file      Family History:     Family History   Problem  "Relation Age of Onset   • Heart disease Mother    • No Known Problems Father    • No Known Problems Daughter    • No Known Problems Daughter    • No Known Problems Paternal Grandmother    • Colon cancer Paternal Grandfather    • No Known Problems Son    • Breast cancer Neg Hx       Current Medications:     Current Outpatient Medications   Medication Sig Dispense Refill   • b complex vitamins tablet Take 1 tablet by mouth daily     • multivitamin (THERAGRAN) TABS Take 1 tablet by mouth daily     • diclofenac sodium (VOLTAREN) 50 mg EC tablet Take 1 tablet (50 mg total) by mouth 2 (two) times a day (Patient not taking: Reported on 3/18/2024) 56 tablet 0   • lidocaine (Lidoderm) 5 % Apply 1 patch topically over 12 hours daily Remove & Discard patch within 12 hours or as directed by MD (Patient not taking: Reported on 3/18/2024) 15 patch 0   • predniSONE 20 mg tablet Take 2 tablets (40 mg total) by mouth daily (Patient not taking: Reported on 3/18/2024) 10 tablet 0     No current facility-administered medications for this visit.      Allergies:     No Known Allergies   Physical Exam:     /84   Pulse 89   Temp 98.9 °F (37.2 °C)   Ht 5' 11\" (1.803 m)   Wt 115 kg (253 lb)   SpO2 99%   BMI 35.29 kg/m²     Physical Exam  Constitutional:       General: He is not in acute distress.     Appearance: Normal appearance. He is not ill-appearing.   HENT:      Head: Normocephalic and atraumatic.      Right Ear: Tympanic membrane, ear canal and external ear normal.      Left Ear: Tympanic membrane, ear canal and external ear normal.      Nose: Nose normal.      Mouth/Throat:      Mouth: Mucous membranes are moist.      Pharynx: Oropharynx is clear. No oropharyngeal exudate or posterior oropharyngeal erythema.   Eyes:      General: No scleral icterus.        Right eye: No discharge.         Left eye: No discharge.      Extraocular Movements: Extraocular movements intact.      Conjunctiva/sclera: Conjunctivae normal.      " Pupils: Pupils are equal, round, and reactive to light.   Cardiovascular:      Rate and Rhythm: Normal rate and regular rhythm.      Pulses: Normal pulses.      Heart sounds: Normal heart sounds. No murmur heard.  Pulmonary:      Effort: Pulmonary effort is normal. No respiratory distress.      Breath sounds: Normal breath sounds.   Abdominal:      General: Bowel sounds are normal.      Palpations: Abdomen is soft.      Tenderness: There is no abdominal tenderness.   Musculoskeletal:      Cervical back: Normal range of motion and neck supple.      Lumbar back: Decreased range of motion. Positive right straight leg raise test.   Lymphadenopathy:      Cervical: No cervical adenopathy.   Skin:     General: Skin is warm and dry.      Capillary Refill: Capillary refill takes less than 2 seconds.   Neurological:      General: No focal deficit present.      Mental Status: He is alert and oriented to person, place, and time. Mental status is at baseline.      Cranial Nerves: No cranial nerve deficit.   Psychiatric:         Mood and Affect: Mood normal.          Valerie Bustamante MD   [facility] [address] [facility]

## 2024-03-25 ENCOUNTER — OFFICE VISIT (OUTPATIENT)
Dept: PHYSICAL THERAPY | Facility: CLINIC | Age: 39
End: 2024-03-25
Payer: COMMERCIAL

## 2024-03-25 DIAGNOSIS — M54.50 ACUTE BILATERAL LOW BACK PAIN WITHOUT SCIATICA: Primary | ICD-10-CM

## 2024-03-25 PROCEDURE — 97110 THERAPEUTIC EXERCISES: CPT | Performed by: PHYSICAL THERAPIST

## 2024-03-25 NOTE — PROGRESS NOTES
PT Re-evaluation     Today's date: 3/25/2024  Patient name: Davide Smith  : 1985  MRN: [id number]  Referring provider: Valerie Bustamante MD  Dx:   Encounter Diagnosis     ICD-10-CM    1. Acute bilateral low back pain with right-sided sciatica  M54.41 Ambulatory referral to PT spine                     Assessment  Assessment details: Pt is a 39 y/o male who presents to physical therapy with primary peripheral neuropathic pain associated with subacute LBP with directional preference for extension complicated by previous low back injuries. Pt has made good improvement in symptoms since beginning PT. He no longer has dermatomal changes and his ROM is significantly improved. However, he continues to have some low back tightness on the R side with bending forward. Nerve gliding as well as continued directional preference exercise continues to improve. Pt would continue to benefit from skilled physical therapy in order to decrease deficits and return to prior level of function.    Impairments: abnormal gait, abnormal or restricted ROM, activity intolerance, impaired physical strength and pain with function  Understanding of Dx/Px/POC: good  Goals  STG( 4 weeks):  Pt will be independent with basic HEP. - MET  Pt will demonstrate increase in lumbar flexion ROM to minimal restriction. - MET      LTG (6 weeks): -ongoing  FOTO will be expected outcome.   Pt will demonstrate non-painful lumbar flexion.  Pt will demonstrate MMT grade comparable to contralateral limb in all deficient planes.  Pt will report ability to tolerate sitting for 1 hour without increase in symptoms.  Pt will be independent with extensive HEP.      Plan  Patient would benefit from: skilled physical therapy  Planned modality interventions: cryotherapy  Planned therapy interventions: manual therapy, neuromuscular re-education, patient education, self care, strengthening, stretching, therapeutic activities, therapeutic exercise and home exercise  "program  Frequency: 1-2x/week.  Duration in weeks: 6  Treatment plan discussed with: patient      Subjective Evaluation    History of Present Illness  Mechanism of injury: Chief Complaint: Pt reports slipping on step and falling and landing in a sitting position on 1/30/24. Pt reports that when he went to get up from the the ground he felt an immediate pop in his back and gradual onset of pain. He states that CT scan was negative. He reports that his pain is minimally improved from this date despite doing some exercises for his back from previous PT with minimal change.     Severity: severe  Irritability: moderate  Nature: nociceptive and peripheral neuropathic  Stage: subacute  Stability: slight improvement    P1: see body chart  Patient Goals  Patient goal: \"alleviate the pain\"      Objective     Postural Observations    Additional Postural Observation Details  Slight increase in L skin fold under the 12th rib, unable to see a lateral shift  Potential L LE longer    Neurological Testing     Reflexes   Left   Patellar (L4): trace (1+)  Achilles (S1): trace (1+)    Right   Patellar (L4): absent (0)  Achilles (S1): trace (1+)    Additional Neurological Details  Dermatome- WNL  Myotome- WNL    (+) SLR  (-) femoral nerve stretch on the R      Active Range of Motion     Additional Active Range of Motion Details  FB: 90% with lateral deviation to the L and tightness on the R  BB: 100%   R SB: 100%   L SB: 100%       Muscle Activation     Additional Muscle Activation Details  Good activation upon command, however, lack of activation during transitions wich increased pain    General Comments:    Lower quarter screen   Hips: unremarkable           Precautions: None  POC expires Unit limit Auth Expiration date PT/OT/ST + Visit Limit?   3/22/24 BOMN After 24th BOMN                           Visit/Unit Tracking  AUTH Status:  Date 2/9              After 24th Used 1               Remaining                    Manuals 2/9 2/15 " "2/22 3/25         Assess PIT             Femoral nerve slider             Lumbar sidelying gapping   BEV gd 3 1x1'; 1x gd 5 BEV gd 5 1x ea          Manual femoral nerve slider   Sidelying BEV- 5'          Neuro Re-Ed             TrA activation in hooklying BEV 10x10\"           TrA heel slide 1x5 HEP 1x10            TrA progression             Bird dog             Femoral nerve slider   Prone- 20x          Supine sciatic nerve tensioner    20x                      Ther Ex             Prone prop 1x1'            PPU 1x10 HEP 1x10  5x10 with strap          Lat glide    20x ea/                                                             Pt edu Sitting posture, pathoanatomy  BEV          Ther Activity                                       Gait Training                                       Modalities                                              "

## 2024-05-20 ENCOUNTER — TELEPHONE (OUTPATIENT)
Age: 39
End: 2024-05-20

## 2024-05-20 NOTE — TELEPHONE ENCOUNTER
Patient calling to see if he needs to have any blood work done.  I did confirm Dr. Bustamante entered lab orders for him in March at the time of his last visit.  He plans to have the blood work completed in the next week.

## 2024-06-06 ENCOUNTER — APPOINTMENT (OUTPATIENT)
Dept: LAB | Facility: HOSPITAL | Age: 39
End: 2024-06-06
Payer: COMMERCIAL

## 2024-06-06 ENCOUNTER — OFFICE VISIT (OUTPATIENT)
Dept: FAMILY MEDICINE CLINIC | Facility: CLINIC | Age: 39
End: 2024-06-06
Payer: COMMERCIAL

## 2024-06-06 VITALS
HEART RATE: 66 BPM | DIASTOLIC BLOOD PRESSURE: 84 MMHG | SYSTOLIC BLOOD PRESSURE: 132 MMHG | OXYGEN SATURATION: 99 % | TEMPERATURE: 98 F | BODY MASS INDEX: 33.94 KG/M2 | WEIGHT: 242.4 LBS | HEIGHT: 71 IN

## 2024-06-06 DIAGNOSIS — Z86.010 HISTORY OF COLON POLYPS: Primary | ICD-10-CM

## 2024-06-06 DIAGNOSIS — Z13.6 SCREENING FOR CARDIOVASCULAR CONDITION: ICD-10-CM

## 2024-06-06 DIAGNOSIS — Z13.1 SCREENING FOR DIABETES MELLITUS: ICD-10-CM

## 2024-06-06 LAB
25(OH)D3 SERPL-MCNC: 28.8 NG/ML (ref 30–100)
ALBUMIN SERPL BCP-MCNC: 4.1 G/DL (ref 3.5–5)
ALP SERPL-CCNC: 54 U/L (ref 34–104)
ALT SERPL W P-5'-P-CCNC: 38 U/L (ref 7–52)
ANION GAP SERPL CALCULATED.3IONS-SCNC: 3 MMOL/L (ref 4–13)
AST SERPL W P-5'-P-CCNC: 35 U/L (ref 13–39)
BILIRUB SERPL-MCNC: 1.21 MG/DL (ref 0.2–1)
BUN SERPL-MCNC: 17 MG/DL (ref 5–25)
CALCIUM SERPL-MCNC: 9.4 MG/DL (ref 8.4–10.2)
CHLORIDE SERPL-SCNC: 104 MMOL/L (ref 96–108)
CHOLEST SERPL-MCNC: 151 MG/DL
CO2 SERPL-SCNC: 31 MMOL/L (ref 21–32)
CREAT SERPL-MCNC: 1.26 MG/DL (ref 0.6–1.3)
ERYTHROCYTE [DISTWIDTH] IN BLOOD BY AUTOMATED COUNT: 12.8 % (ref 11.6–15.1)
EST. AVERAGE GLUCOSE BLD GHB EST-MCNC: 114 MG/DL
GFR SERPL CREATININE-BSD FRML MDRD: 71 ML/MIN/1.73SQ M
GLUCOSE P FAST SERPL-MCNC: 94 MG/DL (ref 65–99)
HBA1C MFR BLD: 5.6 %
HCT VFR BLD AUTO: 44.1 % (ref 36.5–49.3)
HDLC SERPL-MCNC: 45 MG/DL
HGB BLD-MCNC: 15.4 G/DL (ref 12–17)
LDLC SERPL CALC-MCNC: 95 MG/DL (ref 0–100)
MCH RBC QN AUTO: 32 PG (ref 26.8–34.3)
MCHC RBC AUTO-ENTMCNC: 34.9 G/DL (ref 31.4–37.4)
MCV RBC AUTO: 92 FL (ref 82–98)
NONHDLC SERPL-MCNC: 106 MG/DL
PLATELET # BLD AUTO: 225 THOUSANDS/UL (ref 149–390)
PMV BLD AUTO: 9.1 FL (ref 8.9–12.7)
POTASSIUM SERPL-SCNC: 4.4 MMOL/L (ref 3.5–5.3)
PROT SERPL-MCNC: 7.4 G/DL (ref 6.4–8.4)
RBC # BLD AUTO: 4.82 MILLION/UL (ref 3.88–5.62)
SODIUM SERPL-SCNC: 138 MMOL/L (ref 135–147)
TRIGL SERPL-MCNC: 57 MG/DL
TSH SERPL DL<=0.05 MIU/L-ACNC: 3.36 UIU/ML (ref 0.45–4.5)
WBC # BLD AUTO: 4.78 THOUSAND/UL (ref 4.31–10.16)

## 2024-06-06 PROCEDURE — 80053 COMPREHEN METABOLIC PANEL: CPT

## 2024-06-06 PROCEDURE — 83036 HEMOGLOBIN GLYCOSYLATED A1C: CPT

## 2024-06-06 PROCEDURE — 85027 COMPLETE CBC AUTOMATED: CPT

## 2024-06-06 PROCEDURE — 99213 OFFICE O/P EST LOW 20 MIN: CPT | Performed by: STUDENT IN AN ORGANIZED HEALTH CARE EDUCATION/TRAINING PROGRAM

## 2024-06-06 PROCEDURE — 84443 ASSAY THYROID STIM HORMONE: CPT

## 2024-06-06 PROCEDURE — 36415 COLL VENOUS BLD VENIPUNCTURE: CPT

## 2024-06-06 PROCEDURE — 82306 VITAMIN D 25 HYDROXY: CPT

## 2024-06-06 PROCEDURE — 80061 LIPID PANEL: CPT

## 2024-06-06 NOTE — PROGRESS NOTES
Assessment/Plan:         Problem List Items Addressed This Visit        Other    History of colon polyps - Primary    Relevant Orders    Ambulatory Referral to Gastroenterology     Reviewed labs      Subjective:      Patient ID: Davide Smith is a 38 y.o. male.    HPI    Needs referral for GI to repeat colonoscopy. Completed 6/3/2019    The following portions of the patient's history were reviewed and updated as appropriate:   Past Medical History:  He has a past medical history of Webbed penis (7/8/2021).,  _______________________________________________________________________  Medical Problems:  does not have any pertinent problems on file.,  _______________________________________________________________________  Past Surgical History:   has a past surgical history that includes Vasectomy; Vasectomy (2016); and pr scrotoplasty simple (N/A, 7/1/2021).,  _______________________________________________________________________  Family History:  family history includes Colon cancer in his paternal grandfather; Heart disease in his mother; No Known Problems in his daughter, daughter, father, paternal grandmother, and son.,  _______________________________________________________________________  Social History:   reports that he quit smoking about 3 years ago. His smoking use included cigarettes. He has never used smokeless tobacco. He reports current alcohol use. He reports that he does not use drugs.,  _______________________________________________________________________  Allergies:  has No Known Allergies..  _______________________________________________________________________  Current Outpatient Medications   Medication Sig Dispense Refill   • b complex vitamins tablet Take 1 tablet by mouth daily     • multivitamin (THERAGRAN) TABS Take 1 tablet by mouth daily       No current facility-administered medications for this visit.     _______________________________________________________________________  Review  "of Systems   Constitutional:  Negative for activity change, appetite change, fatigue and fever.   HENT:  Negative for congestion, rhinorrhea and sore throat.    Eyes:  Negative for visual disturbance.   Respiratory:  Negative for cough and shortness of breath.    Cardiovascular:  Negative for chest pain.   Gastrointestinal:  Negative for nausea and vomiting.         Objective:  Vitals:    06/06/24 0914   BP: 132/84   BP Location: Left arm   Patient Position: Sitting   Pulse: 66   Temp: 98 °F (36.7 °C)   SpO2: 99%   Weight: 110 kg (242 lb 6.4 oz)   Height: 5' 11\" (1.803 m)     Body mass index is 33.81 kg/m².     Physical Exam  Constitutional:       General: He is not in acute distress.     Appearance: Normal appearance.   HENT:      Head: Normocephalic and atraumatic.   Pulmonary:      Effort: Pulmonary effort is normal. No respiratory distress.   Neurological:      Mental Status: He is alert and oriented to person, place, and time. Mental status is at baseline.   Psychiatric:         Mood and Affect: Mood normal.         Behavior: Behavior normal.         "

## 2024-06-12 ENCOUNTER — TELEPHONE (OUTPATIENT)
Age: 39
End: 2024-06-12

## 2024-06-19 ENCOUNTER — TELEPHONE (OUTPATIENT)
Age: 39
End: 2024-06-19

## 2024-06-19 NOTE — TELEPHONE ENCOUNTER
PT called in requesting if Dr. Bustamante can please write him a letter stating clean bill of health, no issues with drug abuse & or alcohol.     PT needs a medical letter stating the above for his new  employment.     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 They are now only asking for medical letter needed by pt PCP.     For any further information or questions, please call pt back. Thank you!    Davide Smith   [phone]

## 2024-06-19 NOTE — LETTER
June 19, 2024     Patient: Davide Smith  YOB: 1985      To Whom it May Concern:    Davide Smith is under my professional care. Davide has no medical or psychological pathologies nor substance abuse history that would hinder him from performing his job duties.     If you have any questions or concerns, please don't hesitate to call.         Sincerely,          Betina Camilo        CC: No Recipients

## 2024-06-28 ENCOUNTER — OFFICE VISIT (OUTPATIENT)
Dept: GASTROENTEROLOGY | Facility: CLINIC | Age: 39
End: 2024-06-28
Payer: COMMERCIAL

## 2024-06-28 ENCOUNTER — TELEPHONE (OUTPATIENT)
Age: 39
End: 2024-06-28

## 2024-06-28 ENCOUNTER — PREP FOR PROCEDURE (OUTPATIENT)
Dept: GASTROENTEROLOGY | Facility: CLINIC | Age: 39
End: 2024-06-28

## 2024-06-28 VITALS
HEIGHT: 71 IN | DIASTOLIC BLOOD PRESSURE: 93 MMHG | HEART RATE: 68 BPM | TEMPERATURE: 97.4 F | BODY MASS INDEX: 34.02 KG/M2 | WEIGHT: 243 LBS | OXYGEN SATURATION: 100 % | SYSTOLIC BLOOD PRESSURE: 156 MMHG

## 2024-06-28 DIAGNOSIS — Z86.010 HISTORY OF COLON POLYPS: ICD-10-CM

## 2024-06-28 DIAGNOSIS — R05.9 COUGH, UNSPECIFIED TYPE: Primary | ICD-10-CM

## 2024-06-28 DIAGNOSIS — Z86.010 HISTORY OF COLON POLYPS: Primary | ICD-10-CM

## 2024-06-28 PROCEDURE — 99243 OFF/OP CNSLTJ NEW/EST LOW 30: CPT | Performed by: INTERNAL MEDICINE

## 2024-06-28 NOTE — TELEPHONE ENCOUNTER
PT called in stating that 3 days ago he had a sneezing episode followed by coughing, that caused the left side of his throat to begin bothering him.     PT stated he saw his Gastro specialist who recommended he see an ENT.     PT would like to know if Dr. Bustamante can please put in a referral for him to see an ENT specialist?    Please advise & call pt with update on request. Thank you!    Davide Smith   [phone]

## 2024-06-28 NOTE — LETTER
July 2, 2024     Valerie Bustamante MD  [address] [address]    Patient: Davide Smith   YOB: 1985   Date of Visit: 6/28/2024       Dear Dr. Bustamante:    Thank you for referring Davide Smith to me for evaluation. Below are my notes for this consultation.    If you have questions, please do not hesitate to call me. I look forward to following your patient along with you.         Sincerely,        Devon Chilel DO        CC: No Recipients    Devon Chilel DO  6/29/2024  8:26 AM  Signed  [facility] - Outpatient Consultation  Davide Smith 38 y.o. male MRN: [id number]  Encounter: [id number]          ASSESSMENT AND PLAN:      1. History of colon polyps  - Ambulatory Referral to Gastroenterology  -Schedule colonoscopy  ______________________________________________________________________    HPI: Davide is a 38-year-old male who comes the office today on referral from his primary care physician to schedule a colonoscopy based upon a history of colonoscopy performed 5 years ago which identified colon polyp.  There is no family history for colon cancer for colon polyp.  The patient denies any problems with his bowel movement.  He denies diarrhea, constipation, abdominal pain, rectal bleeding, nausea, vomiting, heartburn, dysphagia, odynophagia.  He denies any problems with the previous colonoscopy including problems with the bowel prep, the anesthesia, or the actual procedure.      REVIEW OF SYSTEMS:    CONSTITUTIONAL: Denies any fever, chills, rigors, and weight loss.  HEENT: No earache or tinnitus. Denies hearing loss or visual disturbances.  CARDIOVASCULAR: No chest pain or palpitations.   RESPIRATORY: Denies any cough, hemoptysis, shortness of breath or dyspnea on exertion.  GASTROINTESTINAL: As noted in the History of Present Illness.   GENITOURINARY: No problems with urination. Denies any hematuria or dysuria.  NEUROLOGIC: No dizziness or  "vertigo, denies headaches.   MUSCULOSKELETAL: Denies any muscle or joint pain.   SKIN: Denies skin rashes or itching.   ENDOCRINE: Denies excessive thirst. Denies intolerance to heat or cold.  PSYCHOSOCIAL: Denies depression or anxiety. Denies any recent memory loss.       Historical Information  Past Medical History:   Diagnosis Date   • 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     Past Surgical History:   Procedure Laterality Date   • COLONOSCOPY  2019   • [place] SCROTOPLASTY SIMPLE N/A 07/01/2021    Procedure: SCROTOPLASTY;  Surgeon: Oli Lomas MD;  Location: [facility];  Service: Urology   • VASECTOMY     • VASECTOMY  2016     Social History  Social History     Substance and Sexual Activity   Alcohol Use Not Currently    Comment: social 3x a year     Social History     Substance and Sexual Activity   Drug Use Never     Social History     Tobacco Use   Smoking Status Former   • Current packs/day: 0.00   • Types: Cigarettes, Cigars   • Quit date: 04/2021   • Years since quitting: 3.2   Smokeless Tobacco Never     Family History   Problem Relation Age of Onset   • Heart disease Mother    • No Known Problems Father    • No Known Problems Daughter    • No Known Problems Daughter    • No Known Problems Paternal Grandmother    • Colon cancer Paternal Grandfather    • No Known Problems Son    • Breast cancer Neg Hx        Meds/Allergies      Current Outpatient Medications:   •  b complex vitamins tablet  •  multivitamin (THERAGRAN) TABS    No Known Allergies        Objective    Blood pressure 156/93, pulse 68, temperature (!) 97.4 °F (36.3 °C), temperature source Temporal, height 5' 11\" (1.803 m), weight 110 kg (243 lb), SpO2 100%. Body mass index is 33.89 kg/m².        PHYSICAL EXAM:      General Appearance:   Alert, cooperative, no distress   HEENT:   Normocephalic, atraumatic, anicteric.     Neck:  Supple, symmetrical, trachea midline   Lungs:   Clear to auscultation bilaterally; no rales, rhonchi or wheezing; respirations " unlabored    Heart::   Regular rate and rhythm; no murmur, rub, or gallop.   Abdomen:   Soft, non-tender, non-distended; normal bowel sounds; no masses, no organomegaly    Genitalia:   Deferred    Rectal:   Deferred    Extremities:  No cyanosis, clubbing or edema    Pulses:  2+ and symmetric    Skin:  No jaundice, rashes, or lesions    Lymph nodes:  No palpable cervical lymphadenopathy        Lab Results:   No visits with results within 1 Day(s) from this visit.   Latest known visit with results is:   Appointment on 06/06/2024   Component Date Value   • Cholesterol 06/06/2024 151    • Triglycerides 06/06/2024 57    • HDL, Direct 06/06/2024 45    • LDL Calculated 06/06/2024 95    • Non-HDL-Chol (CHOL-HDL) 06/06/2024 106    • Sodium 06/06/2024 138    • Potassium 06/06/2024 4.4    • Chloride 06/06/2024 104    • CO2 06/06/2024 31    • ANION GAP 06/06/2024 3 (L)    • BUN 06/06/2024 17    • Creatinine 06/06/2024 1.26    • Glucose, Fasting 06/06/2024 94    • Calcium 06/06/2024 9.4    • AST 06/06/2024 35    • ALT 06/06/2024 38    • Alkaline Phosphatase 06/06/2024 54    • Total Protein 06/06/2024 7.4    • Albumin 06/06/2024 4.1    • Total Bilirubin 06/06/2024 1.21 (H)    • eGFR 06/06/2024 71    • WBC 06/06/2024 4.78    • RBC 06/06/2024 4.82    • Hemoglobin 06/06/2024 15.4    • Hematocrit 06/06/2024 44.1    • MCV 06/06/2024 92    • MCH 06/06/2024 32.0    • MCHC 06/06/2024 34.9    • RDW 06/06/2024 12.8    • Platelets 06/06/2024 225    • MPV 06/06/2024 9.1    • TSH 3RD GENERATON 06/06/2024 3.357    • Vit D, 25-Hydroxy 06/06/2024 28.8 (L)    • Hemoglobin A1C 06/06/2024 5.6    • EAG 06/06/2024 114          Radiology Results:   No results found.  Answers submitted by the patient for this visit:  Abdominal Pain Questionnaire (Submitted on 6/21/2024)  Chief Complaint: Abdominal pain  Frequency: rarely  anorexia: No  arthralgias: No  belching: No  constipation: No  diarrhea: No  dysuria: No  fever: No  flatus: No  frequency:  No  headaches: No  hematochezia: No  hematuria: No  melena: No  myalgias: No  nausea: No  weight loss: No  vomiting: No     no

## 2024-06-28 NOTE — PATIENT INSTRUCTIONS
Scheduled date of colonoscopy (as of today):7/23/24  Physician performing colonoscopy:Getachew  Location of colonoscopy:[place]  Bowel prep reviewed with patient:Martin/Miralax  Instructions reviewed with patient by:Raciel martinez  Clearances:  none

## 2024-06-29 NOTE — PROGRESS NOTES
[facility] - Outpatient Consultation  Davide Smith 38 y.o. male MRN: [id number]  Encounter: [id number]          ASSESSMENT AND PLAN:      1. History of colon polyps  - Ambulatory Referral to Gastroenterology  -Schedule colonoscopy  ______________________________________________________________________    HPI: Davide is a 38-year-old male who comes the office today on referral from his primary care physician to schedule a colonoscopy based upon a history of colonoscopy performed 5 years ago which identified colon polyp.  There is no family history for colon cancer for colon polyp.  The patient denies any problems with his bowel movement.  He denies diarrhea, constipation, abdominal pain, rectal bleeding, nausea, vomiting, heartburn, dysphagia, odynophagia.  He denies any problems with the previous colonoscopy including problems with the bowel prep, the anesthesia, or the actual procedure.      REVIEW OF SYSTEMS:    CONSTITUTIONAL: Denies any fever, chills, rigors, and weight loss.  HEENT: No earache or tinnitus. Denies hearing loss or visual disturbances.  CARDIOVASCULAR: No chest pain or palpitations.   RESPIRATORY: Denies any cough, hemoptysis, shortness of breath or dyspnea on exertion.  GASTROINTESTINAL: As noted in the History of Present Illness.   GENITOURINARY: No problems with urination. Denies any hematuria or dysuria.  NEUROLOGIC: No dizziness or vertigo, denies headaches.   MUSCULOSKELETAL: Denies any muscle or joint pain.   SKIN: Denies skin rashes or itching.   ENDOCRINE: Denies excessive thirst. Denies intolerance to heat or cold.  PSYCHOSOCIAL: Denies depression or anxiety. Denies any recent memory loss.       Historical Information   Past Medical History:   Diagnosis Date    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     Past Surgical History:   Procedure Laterality Date    COLONOSCOPY  2019    [place] SCROTOPLASTY SIMPLE N/A 07/01/2021    Procedure: SCROTOPLASTY;  Surgeon: Oli Lomas,  "MD;  Location: [facility] OR;  Service: Urology    VASECTOMY      VASECTOMY  2016     Social History   Social History     Substance and Sexual Activity   Alcohol Use Not Currently    Comment: social 3x a year     Social History     Substance and Sexual Activity   Drug Use Never     Social History     Tobacco Use   Smoking Status Former    Current packs/day: 0.00    Types: Cigarettes, Cigars    Quit date: 04/2021    Years since quitting: 3.2   Smokeless Tobacco Never     Family History   Problem Relation Age of Onset    Heart disease Mother     No Known Problems Father     No Known Problems Daughter     No Known Problems Daughter     No Known Problems Paternal Grandmother     Colon cancer Paternal Grandfather     No Known Problems Son     Breast cancer Neg Hx        Meds/Allergies       Current Outpatient Medications:     b complex vitamins tablet    multivitamin (THERAGRAN) TABS    No Known Allergies        Objective     Blood pressure 156/93, pulse 68, temperature (!) 97.4 °F (36.3 °C), temperature source Temporal, height 5' 11\" (1.803 m), weight 110 kg (243 lb), SpO2 100%. Body mass index is 33.89 kg/m².        PHYSICAL EXAM:      General Appearance:   Alert, cooperative, no distress   HEENT:   Normocephalic, atraumatic, anicteric.     Neck:  Supple, symmetrical, trachea midline   Lungs:   Clear to auscultation bilaterally; no rales, rhonchi or wheezing; respirations unlabored    Heart::   Regular rate and rhythm; no murmur, rub, or gallop.   Abdomen:   Soft, non-tender, non-distended; normal bowel sounds; no masses, no organomegaly    Genitalia:   Deferred    Rectal:   Deferred    Extremities:  No cyanosis, clubbing or edema    Pulses:  2+ and symmetric    Skin:  No jaundice, rashes, or lesions    Lymph nodes:  No palpable cervical lymphadenopathy        Lab Results:   No visits with results within 1 Day(s) from this visit.   Latest known visit with results is:   Appointment on 06/06/2024   Component Date Value    " Cholesterol 06/06/2024 151     Triglycerides 06/06/2024 57     HDL, Direct 06/06/2024 45     LDL Calculated 06/06/2024 95     Non-HDL-Chol (CHOL-HDL) 06/06/2024 106     Sodium 06/06/2024 138     Potassium 06/06/2024 4.4     Chloride 06/06/2024 104     CO2 06/06/2024 31     ANION GAP 06/06/2024 3 (L)     BUN 06/06/2024 17     Creatinine 06/06/2024 1.26     Glucose, Fasting 06/06/2024 94     Calcium 06/06/2024 9.4     AST 06/06/2024 35     ALT 06/06/2024 38     Alkaline Phosphatase 06/06/2024 54     Total Protein 06/06/2024 7.4     Albumin 06/06/2024 4.1     Total Bilirubin 06/06/2024 1.21 (H)     eGFR 06/06/2024 71     WBC 06/06/2024 4.78     RBC 06/06/2024 4.82     Hemoglobin 06/06/2024 15.4     Hematocrit 06/06/2024 44.1     MCV 06/06/2024 92     MCH 06/06/2024 32.0     MCHC 06/06/2024 34.9     RDW 06/06/2024 12.8     Platelets 06/06/2024 225     MPV 06/06/2024 9.1     TSH 3RD GENERATON 06/06/2024 3.357     Vit D, 25-Hydroxy 06/06/2024 28.8 (L)     Hemoglobin A1C 06/06/2024 5.6     EAG 06/06/2024 114          Radiology Results:   No results found.  Answers submitted by the patient for this visit:  Abdominal Pain Questionnaire (Submitted on 6/21/2024)  Chief Complaint: Abdominal pain  Frequency: rarely  anorexia: No  arthralgias: No  belching: No  constipation: No  diarrhea: No  dysuria: No  fever: No  flatus: No  frequency: No  headaches: No  hematochezia: No  hematuria: No  melena: No  myalgias: No  nausea: No  weight loss: No  vomiting: No

## 2024-07-22 ENCOUNTER — TELEPHONE (OUTPATIENT)
Age: 39
End: 2024-07-22

## 2024-07-22 NOTE — TELEPHONE ENCOUNTER
Scheduled date of colonoscopy (as of today): 8/29/24    Physician performing colonoscopy: Dr. Chilel    Location of colonoscopy: [place]

## 2024-08-29 ENCOUNTER — HOSPITAL ENCOUNTER (OUTPATIENT)
Dept: GASTROENTEROLOGY | Facility: HOSPITAL | Age: 39
Setting detail: OUTPATIENT SURGERY
End: 2024-08-29
Attending: INTERNAL MEDICINE
Payer: COMMERCIAL

## 2024-08-29 ENCOUNTER — ANESTHESIA (OUTPATIENT)
Dept: GASTROENTEROLOGY | Facility: HOSPITAL | Age: 39
End: 2024-08-29

## 2024-08-29 ENCOUNTER — ANESTHESIA EVENT (OUTPATIENT)
Dept: GASTROENTEROLOGY | Facility: HOSPITAL | Age: 39
End: 2024-08-29

## 2024-08-29 VITALS
WEIGHT: 231.48 LBS | BODY MASS INDEX: 32.41 KG/M2 | DIASTOLIC BLOOD PRESSURE: 77 MMHG | HEIGHT: 71 IN | HEART RATE: 65 BPM | TEMPERATURE: 98.9 F | RESPIRATION RATE: 14 BRPM | OXYGEN SATURATION: 100 % | SYSTOLIC BLOOD PRESSURE: 147 MMHG

## 2024-08-29 DIAGNOSIS — Z86.010 HISTORY OF COLON POLYPS: ICD-10-CM

## 2024-08-29 PROCEDURE — 45380 COLONOSCOPY AND BIOPSY: CPT | Performed by: INTERNAL MEDICINE

## 2024-08-29 PROCEDURE — 88305 TISSUE EXAM BY PATHOLOGIST: CPT | Performed by: PATHOLOGY

## 2024-08-29 RX ORDER — PROPOFOL 10 MG/ML
INJECTION, EMULSION INTRAVENOUS AS NEEDED
Status: DISCONTINUED | OUTPATIENT
Start: 2024-08-29 | End: 2024-08-29

## 2024-08-29 RX ORDER — SODIUM CHLORIDE, SODIUM LACTATE, POTASSIUM CHLORIDE, CALCIUM CHLORIDE 600; 310; 30; 20 MG/100ML; MG/100ML; MG/100ML; MG/100ML
INJECTION, SOLUTION INTRAVENOUS CONTINUOUS PRN
Status: DISCONTINUED | OUTPATIENT
Start: 2024-08-29 | End: 2024-08-29

## 2024-08-29 RX ORDER — PROPOFOL 10 MG/ML
INJECTION, EMULSION INTRAVENOUS CONTINUOUS PRN
Status: DISCONTINUED | OUTPATIENT
Start: 2024-08-29 | End: 2024-08-29

## 2024-08-29 RX ADMIN — PROPOFOL 140 MG: 10 INJECTION, EMULSION INTRAVENOUS at 07:38

## 2024-08-29 RX ADMIN — PROPOFOL 150 MCG/KG/MIN: 10 INJECTION, EMULSION INTRAVENOUS at 07:39

## 2024-08-29 RX ADMIN — SODIUM CHLORIDE, SODIUM LACTATE, POTASSIUM CHLORIDE, AND CALCIUM CHLORIDE: .6; .31; .03; .02 INJECTION, SOLUTION INTRAVENOUS at 07:06

## 2024-08-29 NOTE — DISCHARGE INSTRUCTIONS
.Colonoscopy   WHAT YOU NEED TO KNOW:   A colonoscopy is a procedure to examine the inside of your colon (intestine) with a scope. Polyps or tissue growths may have been removed during your colonoscopy. It is normal to feel bloated and to have some abdominal discomfort. You should be passing gas. If you have hemorrhoids or you had polyps removed, you may have a small amount of bleeding.        DISCHARGE INSTRUCTIONS:   Seek care immediately if:   You have sudden, severe abdominal pain.     You have problems swallowing.     You have a large amount of black, sticky bowel movements or blood in your bowel movements.     You have sudden trouble breathing.     You feel weak, lightheaded, or faint or your heart beats faster than normal for you.     Contact your healthcare provider if:   You have a fever and chills.      You have nausea or are vomiting.      Your abdomen is bloated or feels full and hard.     You have abdominal pain.   You have black, sticky bowel movements or blood in your bowel movements.  You have not had a bowel movement for 3 days after your procedure.  You have rash or hives.  You have questions or concerns about your procedure.    Activity:   Do not lift, strain, or run for 24 hours after your procedure.     Rest after your procedure. You have been given medicine to relax you. Do not drive or make important decisions until the day after your procedure. Return to your normal activity as directed.     Relieve gas and discomfort from bloating by lying on your right side with a heating pad on your abdomen. You may need to take short walks to help the gas move out. Eat small meals until bloating is relieved.  Follow up with your healthcare provider as directed: Write down your questions so you remember to ask them during your visits.     If you take a “blood thinner”, please review the specific instructions from your endoscopist about when you should resume it. These can be found in the “Recommendation”  and “Your Medication list” sections of this After Visit Summary.

## 2024-08-29 NOTE — H&P
"History and Physical -  [facility]  Davide Smith 38 y.o. male MRN: [id number]      HPI: Davide Smith is a 38 y.o. year old male who presents for history of colon polyps      REVIEW OF SYSTEMS: Per the HPI, and otherwise unremarkable.    Historical Information   Past Medical History:   Diagnosis Date    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     Past Surgical History:   Procedure Laterality Date    COLONOSCOPY  2019    [place] SCROTOPLASTY SIMPLE N/A 07/01/2021    Procedure: SCROTOPLASTY;  Surgeon: Oli Lomas MD;  Location[facility];  Service: Urology    VASECTOMY      VASECTOMY  2016     Social History   Social History     Substance and Sexual Activity   Alcohol Use Not Currently    Comment: social 3x a year     Social History     Substance and Sexual Activity   Drug Use Never     Social History     Tobacco Use   Smoking Status Former    Current packs/day: 0.00    Types: Cigarettes, Cigars    Quit date: 04/2021    Years since quitting: 3.4   Smokeless Tobacco Never     Family History   Problem Relation Age of Onset    Heart disease Mother     No Known Problems Father     No Known Problems Daughter     No Known Problems Daughter     No Known Problems Paternal Grandmother     Colon cancer Paternal Grandfather     No Known Problems Son     Breast cancer Neg Hx        Meds/Allergies     Not in a hospital admission.    No Known Allergies    Objective     Blood pressure 132/84, pulse 74, temperature 97.9 °F (36.6 °C), temperature source Temporal, resp. rate 16, height 5' 11\" (1.803 m), weight 105 kg (231 lb 7.7 oz), SpO2 97%.      PHYSICAL EXAM    Gen: NAD  CV: RRR  CHEST: Clear  ABD: soft, NT/ND  EXT: no edema      ASSESSMENT/PLAN:  This is a 38 y.o. year old male here for colonoscopy, and he is stable and optimized for his procedure.          "

## 2024-08-29 NOTE — ANESTHESIA PREPROCEDURE EVALUATION
Procedure:  COLONOSCOPY    Obesity BMI 32  Neg stress test 2/2022     Physical Exam    Airway    Mallampati score: II  TM Distance: >3 FB  Neck ROM: full     Dental   No notable dental hx     Cardiovascular  Cardiovascular exam normal    Pulmonary  Pulmonary exam normal     Other Findings        Anesthesia Plan  ASA Score- 2     Anesthesia Type- IV sedation with anesthesia with ASA Monitors.         Additional Monitors:     Airway Plan:            Plan Factors-Exercise tolerance (METS): >4 METS.    Chart reviewed.   Existing labs reviewed. Patient summary reviewed.    Patient is not a current smoker.              Induction- intravenous.    Postoperative Plan-         Informed Consent- Anesthetic plan and risks discussed with patient.  I personally reviewed this patient with the CRNA. Discussed and agreed on the Anesthesia Plan with the CRNA..

## 2024-08-29 NOTE — ANESTHESIA POSTPROCEDURE EVALUATION
Post-Op Assessment Note    CV Status:  Stable    Pain management: adequate       Mental Status:  Awake and sleepy   Hydration Status:  Euvolemic   PONV Controlled:  Controlled   Airway Patency:  Patent     Post Op Vitals Reviewed: Yes    No anethesia notable event occurred.    Staff: CRNA               BP   125/69   Temp 98.9   Pulse 78   Resp 15   SpO2 97

## 2024-09-05 PROCEDURE — 88305 TISSUE EXAM BY PATHOLOGIST: CPT | Performed by: PATHOLOGY

## 2025-01-21 ENCOUNTER — OFFICE VISIT (OUTPATIENT)
Dept: FAMILY MEDICINE CLINIC | Facility: CLINIC | Age: 40
End: 2025-01-21
Payer: COMMERCIAL

## 2025-01-21 ENCOUNTER — TELEPHONE (OUTPATIENT)
Dept: OBGYN CLINIC | Facility: CLINIC | Age: 40
End: 2025-01-21

## 2025-01-21 VITALS
OXYGEN SATURATION: 99 % | DIASTOLIC BLOOD PRESSURE: 82 MMHG | HEART RATE: 79 BPM | HEIGHT: 71 IN | SYSTOLIC BLOOD PRESSURE: 124 MMHG | BODY MASS INDEX: 32.29 KG/M2 | TEMPERATURE: 97.8 F

## 2025-01-21 DIAGNOSIS — L64.9 MALE PATTERN BALDNESS: ICD-10-CM

## 2025-01-21 DIAGNOSIS — G56.03 BILATERAL CARPAL TUNNEL SYNDROME: Primary | ICD-10-CM

## 2025-01-21 PROCEDURE — 99214 OFFICE O/P EST MOD 30 MIN: CPT | Performed by: STUDENT IN AN ORGANIZED HEALTH CARE EDUCATION/TRAINING PROGRAM

## 2025-01-21 NOTE — PROGRESS NOTES
"Name: Davide Smith      : 1985      MRN: [id number]  Encounter Provider: Valerie Bustamante MD  Encounter Date: 2025   Encounter department: [facility] [address]  :  Assessment & Plan  Bilateral carpal tunnel syndrome    Orders:  •  Ambulatory Referral to Orthopedic Surgery; Future  •  Cock Up Wrist Splint  •  diclofenac sodium (VOLTAREN) 50 mg EC tablet; Take 1 tablet (50 mg total) by mouth 2 (two) times a day    Male pattern baldness    Orders:  •  Minoxidil 5 % FOAM; Apply 1 Dose topically 2 (two) times a day           History of Present Illness   Hand Pain   Pertinent negatives include no chest pain.       Carpal tunnel symptoms on and off for years. Sworsened in the last year. Had to reduce his working out, reduces his working hours (works with his hands). His hands get numb and wakes him up. Occassionjally will shoot to his elbow, unable to close his hands all the way, poor . Irritated when driving.  is weaker, dropping objects    Hair loss:    Review of Systems   Constitutional:  Negative for activity change, appetite change, fatigue and fever.   HENT:  Negative for congestion, rhinorrhea and sore throat.    Eyes:  Negative for visual disturbance.   Respiratory:  Negative for cough and shortness of breath.    Cardiovascular:  Negative for chest pain.   Gastrointestinal:  Negative for nausea and vomiting.   Musculoskeletal:  Positive for arthralgias.       Objective   /82 (BP Location: Left arm, Patient Position: Sitting, Cuff Size: Large)   Pulse 79   Temp 97.8 °F (36.6 °C) (Temporal)   Ht 5' 11\" (1.803 m)   SpO2 99%   BMI 32.29 kg/m²      Physical Exam  Vitals and nursing note reviewed.   Constitutional:       General: He is not in acute distress.     Appearance: He is well-developed.   HENT:      Head: Normocephalic and atraumatic.   Eyes:      Conjunctiva/sclera: Conjunctivae normal.   Cardiovascular:      Rate and Rhythm: Normal rate " and regular rhythm.      Heart sounds: No murmur heard.  Pulmonary:      Effort: Pulmonary effort is normal. No respiratory distress.      Breath sounds: Normal breath sounds.   Abdominal:      Palpations: Abdomen is soft.      Tenderness: There is no abdominal tenderness.   Musculoskeletal:         General: No swelling.      Cervical back: Neck supple.      Comments: Tinel sign positive   Skin:     General: Skin is warm and dry.      Capillary Refill: Capillary refill takes less than 2 seconds.   Neurological:      Mental Status: He is alert.   Psychiatric:         Mood and Affect: Mood normal.

## 2025-01-21 NOTE — ASSESSMENT & PLAN NOTE
Orders:  •  Ambulatory Referral to Orthopedic Surgery; Future  •  Cock Up Wrist Splint  •  diclofenac sodium (VOLTAREN) 50 mg EC tablet; Take 1 tablet (50 mg total) by mouth 2 (two) times a day

## 2025-01-21 NOTE — TELEPHONE ENCOUNTER
Spoke with him asking if he has a new insurance as his came back rejected and we are not par with them (insured verified that it was the only one he had and I informed him it was coming back rejected to check with them on the static and if it is active where he can go. He thanked me for letting him know.

## 2025-01-23 NOTE — PATIENT INSTRUCTIONS
Patient Education     Carpal Tunnel Exercises   About this topic   Carpal tunnel syndrome is a very common health problem. It is most often caused by doing hand or wrist movements over and over. It can also be caused by using the lower arm muscles too much.  The carpal tunnel is the small area in your wrist that your median nerve runs through. A tough band of tissues called a ligament holds everything in place over the carpal tunnel. Your median nerve runs from your neck through your lower arm into your hand. If this nerve is squeezed at the wrist area, you may feel pain and have other signs. Your hand, fingers, and wrist may feel weak, numb, or tingly. This is called carpal tunnel syndrome.  Your doctor may want you to try exercises to help your signs. Other times, you will do these exercises after surgery.  General   Before starting with a program, ask your doctor if you are healthy enough to do these exercises. Your doctor may have you work with a  or physical therapist to make a safe exercise program to meet your needs.  Stretching Exercises   Stretching exercises keep your muscles flexible. They also stop them from getting tight. Start by doing each of these stretches 2 to 3 times. In order for your body to make changes, you will need to hold these stretches for 20 to 30 seconds. Repeat each exercise 2 to 3 times each day. Do all exercises slowly.  Wrist stretches bending back ? Straighten your elbow and have your palm facing up. Keeping your elbow straight, bend your wrist back so that your fingers are now pointing to the floor. Grab your hand with your other hand and push back the wrist until you feel a stretch. If you just had surgery, you should not do this exercise until your therapist or doctor tells you it is OK.  Strengthening Exercises   Strengthening exercises keep your muscles firm and strong. Sit while doing these exercises. Be sure to use good posture. Start by repeating each exercise 2 to 3  times. Work up to doing each exercise 10 times. Try to do the exercises 2 to 3 times each day. Do all exercises slowly.  Tendon gliding exercises using 4 positions ? Start by holding your hand with your fingers straight. Then, bend only the last two joints of your fingers and move your fingers into a hook or claw position. Next, straighten your fingers and bend your knuckles to make a flat table top position. This is also called the duckbill position. Last, make a full fist. Moving your hand into all 4 positions is one exercise.  Wrist exercises:  Side-to-side ? Hold one arm still using your other hand. Move your hand from side to side.  Up and down ? Hold one arm still using your other hand. Bend your wrist up and down.  Wrist circles ? Move each wrist in a [place] in one direction. Now, move each wrist in a [place] in the other direction.           What will the results be?   Less pain, pressure, stiffness, and swelling in your wrist and hand  Ease numbness and tingling in your hand and fingers  Increased blood flow to the nerves, muscles, and joints of your wrist and hand to help healing  Increased hand and  strength  Keep your muscles and joints strong and flexible  Helpful tips   Stay active and work out to keep your muscles strong and flexible.  Be sure you do not hold your breath when exercising. This can raise your blood pressure. If you tend to hold your breath, try counting out loud when exercising. If any exercise bothers you, stop right away.  Always warm up before stretching. Heated muscles stretch much easier than cool muscles. Stretching cool muscles can lead to injury.  Try walking and swinging your arms at an easy pace for a few minutes to warm up your muscles. Do this again after exercising.  Never bounce when doing stretches.  Doing exercises before a meal may be a good way to get into a routine.  Exercise may be slightly uncomfortable, but you should not have sharp pains. If you do get sharp  pains, stop what you are doing. If the sharp pains continue, call your doctor.  Last Reviewed Date   2021-05-10  Consumer Information Use and Disclaimer   This generalized information is a limited summary of diagnosis, treatment, and/or medication information. It is not meant to be comprehensive and should be used as a tool to help the user understand and/or assess potential diagnostic and treatment options. It does NOT include all information about conditions, treatments, medications, side effects, or risks that may apply to a specific patient. It is not intended to be medical advice or a substitute for the medical advice, diagnosis, or treatment of a health care provider based on the health care provider's examination and assessment of a patient’s specific and unique circumstances. Patients must speak with a health care provider for complete information about their health, medical questions, and treatment options, including any risks or benefits regarding use of medications. This information does not endorse any treatments or medications as safe, effective, or approved for treating a specific patient. Up[employer] and its affiliates disclaim any warranty or liability relating to this information or the use thereof. The use of this information is governed by the Terms of Use, available at https://www.woltersApptentiveuwer.com/en/know/clinical-effectiveness-terms   Copyright   Copyright © 2024 [employer], [employer] and its affiliates and/or licensors. All rights reserved.

## 2025-01-23 NOTE — PROGRESS NOTES
Orthopaedic Surgery - Office Note  Davide Smith (39 y.o. male)   : 1985   MRN: [id number]  Encounter Date: 2025    Chief Complaint   Patient presents with    Right Wrist - Pain    Left Wrist - Pain         Assessment/Plan  Diagnoses and all orders for this visit:    Right hand pain  -     US MSK limited; Future  -     Ambulatory Referral to Orthopedic Surgery; Future  -     Durable Medical Equipment    Numbness and tingling in right hand  -     US MSK limited; Future  -     Ambulatory Referral to Orthopedic Surgery; Future  -     Durable Medical Equipment    Left hand pain  -     US MSK limited; Future  -     Ambulatory Referral to Orthopedic Surgery; Future  -     Durable Medical Equipment    Numbness and tingling in left hand  -     US MSK limited; Future  -     Ambulatory Referral to Orthopedic Surgery; Future  -     Durable Medical Equipment    Bilateral carpal tunnel syndrome  -     Ambulatory Referral to Orthopedic Surgery    The carpal tunnel syndrome diagnosis was reviewed with the patient in the office today.  Initial treatment recommendations would consist of nighttime carpal tunnel splinting.  Oral anti-inflammatory such as Aleve 1 tablet twice daily with food stopping and calling if any stomach upset occurs.  Patient will be provided a physician directed home exercise program for carpal tunnel syndrome and will be available in the after visit summary.  In addition patient will be sent for an ultrasound of the wrist to confirm the diagnosis.  Patient will follow-up with a hand surgeon to discuss the success/failure of the conservative treatments as well as the ultrasound results.  All question concerns were answered in the office today.    Due to the paresthesia symptoms in the small digit on the right greater than left hand I will also ultrasound the cubital tunnel to rule out nerve impingement/subluxation.     Return for Recheck with hand surgeon to discuss ultrasound  "results.        History of Present Illness  This is a new patient with right and left hand pain with associated numbness and tingling.  He has had carpal tunnel syndrome symptoms and reviewed them with his PCP most recently on 1/21/2025.  Cock up wrist places were recommended, but not utilized today.  He is having pain involving the thumb index and middle finger that is worse in the morning whenever he wakes up.  Symptoms are also aggravated by working out.  Driving increases his symptoms.  He has started to drop items due to the numbness and tingling.  Patient reports he has had to stop working months ago because of the dropping of items.  He states he has had no treatment other than some compression sleeves.  He is right-hand dominant.  He reports he does occasionally get numbness and tingling in the small finger more so on the right than the left.  He reports mild discomfort in the right elbow but not the left elbow.    Review of Systems  Pertinent items are noted in HPI.  All other systems were reviewed and are negative.    Physical Exam  Ht 5' 11\" (1.803 m)   Wt 105 kg (231 lb)   BMI 32.22 kg/m²   Cons: Appears well.  No apparent distress.  Psych: Alert. Oriented x3.  Mood and affect normal.    Patient's right wrist is without skin breakdown lesion or signs of infection.  There is no thenar atrophy or muscle wasting appreciated.  He has a positive Phalen's and a positive Tinel's at less than 5 seconds.  His  strength and pinch strength is decreased to 4 out of 5.  His capillary refill is normal.  There are no trigger fingers or Dupuytren contractures noted.  Pinch strength is decreased at all digits.  He has a mildly positive Tinel's at the cubital tunnel.  The rest of his elbow exam is unremarkable.  He has no tenderness at the CMC joint and a negative Finklestein's test.  He has a well-maintained active and passive range of motion of the wrist.    Patient's left wrist is without skin breakdown lesion or " signs of infection.  There is no thenar atrophy or muscle wasting appreciated.  He has a positive Phalen's and a positive Tinel's at less than 5 seconds.  His  strength and pinch strength is decreased to 4 out of 5.  His capillary refill is normal.  There are no trigger fingers or Dupuytren contractures noted.  Pinch strength is decreased at all digits.  He has a negative Tinel's at the cubital tunnel but mild tenderness in the medial elbow.  The rest of his elbow exam is unremarkable.  He has no tenderness at the CMC joint and a negative Finklestein's test.  He has a well-maintained active and passive range of motion of the wrist.          Studies Reviewed  PCP notes were reviewed for today's visit.  X-rays not indicated at this time    Procedures  No procedures today.    Medical, Surgical, Family, and Social History  The patient's medical history, family history, and social history, were reviewed and updated as appropriate.    Past Medical History:   Diagnosis Date    Colon polyp     Depression     A little over a year    Webbed penis 07/08/2021       Past Surgical History:   Procedure Laterality Date    COLONOSCOPY  2019    LA SCROTOPLASTY SIMPLE N/A 07/01/2021    Procedure: SCROTOPLASTY;  Surgeon: Oli Lomas MD;  Location: [facility];  Service: Urology    VASECTOMY      VASECTOMY  2016       Family History   Problem Relation Age of Onset    Heart disease Mother     No Known Problems Father     No Known Problems Daughter     No Known Problems Daughter     No Known Problems Paternal Grandmother     Colon cancer Paternal Grandfather     No Known Problems Son     Breast cancer Neg Hx        Social History     Occupational History    Not on file   Tobacco Use    Smoking status: Former     Current packs/day: 0.00     Types: Cigarettes, Cigars     Quit date: 04/2021     Years since quitting: 3.8    Smokeless tobacco: Never   Vaping Use    Vaping status: Never Used   Substance and Sexual Activity     Alcohol use: Yes     Comment: social 3x a year    Drug use: Never    Sexual activity: Yes     Partners: Female     Birth control/protection: None       No Known Allergies      Current Outpatient Medications:     b complex vitamins tablet, Take 1 tablet by mouth daily, Disp: , Rfl:     diclofenac sodium (VOLTAREN) 50 mg EC tablet, Take 1 tablet (50 mg total) by mouth 2 (two) times a day, Disp: 60 tablet, Rfl: 0    Minoxidil 5 % FOAM, Apply 1 Dose topically 2 (two) times a day, Disp: 60 g, Rfl: 0    multivitamin (THERAGRAN) TABS, Take 1 tablet by mouth daily, Disp: , Rfl:       Mikey Sarmiento PA-C

## 2025-01-25 ENCOUNTER — OFFICE VISIT (OUTPATIENT)
Dept: OBGYN CLINIC | Facility: CLINIC | Age: 40
End: 2025-01-25
Payer: COMMERCIAL

## 2025-01-25 VITALS — WEIGHT: 231 LBS | HEIGHT: 71 IN | BODY MASS INDEX: 32.34 KG/M2

## 2025-01-25 DIAGNOSIS — M79.642 LEFT HAND PAIN: ICD-10-CM

## 2025-01-25 DIAGNOSIS — R20.0 NUMBNESS AND TINGLING IN RIGHT HAND: ICD-10-CM

## 2025-01-25 DIAGNOSIS — R20.2 NUMBNESS AND TINGLING IN RIGHT HAND: ICD-10-CM

## 2025-01-25 DIAGNOSIS — G56.03 BILATERAL CARPAL TUNNEL SYNDROME: ICD-10-CM

## 2025-01-25 DIAGNOSIS — R20.2 NUMBNESS AND TINGLING IN LEFT HAND: ICD-10-CM

## 2025-01-25 DIAGNOSIS — M79.641 RIGHT HAND PAIN: Primary | ICD-10-CM

## 2025-01-25 DIAGNOSIS — R20.0 NUMBNESS AND TINGLING IN LEFT HAND: ICD-10-CM

## 2025-01-25 PROCEDURE — 99203 OFFICE O/P NEW LOW 30 MIN: CPT | Performed by: PHYSICIAN ASSISTANT

## 2025-02-05 ENCOUNTER — HOSPITAL ENCOUNTER (OUTPATIENT)
Dept: ULTRASOUND IMAGING | Facility: HOSPITAL | Age: 40
Discharge: HOME | End: 2025-02-05
Payer: COMMERCIAL

## 2025-02-05 DIAGNOSIS — R20.0 NUMBNESS AND TINGLING IN LEFT HAND: ICD-10-CM

## 2025-02-05 DIAGNOSIS — R20.2 NUMBNESS AND TINGLING IN LEFT HAND: ICD-10-CM

## 2025-02-05 DIAGNOSIS — R20.0 NUMBNESS AND TINGLING IN RIGHT HAND: ICD-10-CM

## 2025-02-05 DIAGNOSIS — M79.642 LEFT HAND PAIN: ICD-10-CM

## 2025-02-05 DIAGNOSIS — R20.2 NUMBNESS AND TINGLING IN RIGHT HAND: ICD-10-CM

## 2025-02-05 DIAGNOSIS — M79.641 RIGHT HAND PAIN: ICD-10-CM

## 2025-02-05 PROCEDURE — 76882 US LMTD JT/FCL EVL NVASC XTR: CPT

## 2025-02-07 ENCOUNTER — OFFICE VISIT (OUTPATIENT)
Dept: OBGYN CLINIC | Facility: CLINIC | Age: 40
End: 2025-02-07
Payer: COMMERCIAL

## 2025-02-07 VITALS — HEIGHT: 71 IN | WEIGHT: 231 LBS | BODY MASS INDEX: 32.34 KG/M2

## 2025-02-07 DIAGNOSIS — M77.01 MEDIAL EPICONDYLITIS OF BOTH ELBOWS: ICD-10-CM

## 2025-02-07 DIAGNOSIS — G56.03 BILATERAL CARPAL TUNNEL SYNDROME: Primary | ICD-10-CM

## 2025-02-07 DIAGNOSIS — G56.23 CUBITAL TUNNEL SYNDROME OF BOTH UPPER EXTREMITIES: ICD-10-CM

## 2025-02-07 DIAGNOSIS — M77.02 MEDIAL EPICONDYLITIS OF BOTH ELBOWS: ICD-10-CM

## 2025-02-07 PROCEDURE — 20526 THER INJECTION CARP TUNNEL: CPT | Performed by: STUDENT IN AN ORGANIZED HEALTH CARE EDUCATION/TRAINING PROGRAM

## 2025-02-07 PROCEDURE — 99214 OFFICE O/P EST MOD 30 MIN: CPT | Performed by: STUDENT IN AN ORGANIZED HEALTH CARE EDUCATION/TRAINING PROGRAM

## 2025-02-07 RX ORDER — ROPIVACAINE HYDROCHLORIDE 5 MG/ML
1 INJECTION, SOLUTION EPIDURAL; INFILTRATION; PERINEURAL
Status: COMPLETED | OUTPATIENT
Start: 2025-02-07 | End: 2025-02-07

## 2025-02-07 RX ORDER — BETAMETHASONE SODIUM PHOSPHATE AND BETAMETHASONE ACETATE 3; 3 MG/ML; MG/ML
6 INJECTION, SUSPENSION INTRA-ARTICULAR; INTRALESIONAL; INTRAMUSCULAR; SOFT TISSUE
Status: COMPLETED | OUTPATIENT
Start: 2025-02-07 | End: 2025-02-07

## 2025-02-07 RX ADMIN — BETAMETHASONE SODIUM PHOSPHATE AND BETAMETHASONE ACETATE 6 MG: 3; 3 INJECTION, SUSPENSION INTRA-ARTICULAR; INTRALESIONAL; INTRAMUSCULAR; SOFT TISSUE at 09:30

## 2025-02-07 RX ADMIN — ROPIVACAINE HYDROCHLORIDE 1 ML: 5 INJECTION, SOLUTION EPIDURAL; INFILTRATION; PERINEURAL at 09:30

## 2025-02-07 NOTE — PROGRESS NOTES
ORTHOPAEDIC HAND, WRIST, AND ELBOW OFFICE  VISIT      ASSESSMENT/PLAN:      Diagnoses and all orders for this visit:    Bilateral carpal tunnel syndrome  -     Ambulatory Referral to Orthopedic Surgery  -     Hand/upper extremity injection: R carpal tunnel    Medial epicondylitis of both elbows  -     Ambulatory Referral to Orthopedic Surgery  -     Ambulatory Referral to PT/OT Hand Therapy; Future    Cubital tunnel syndrome of both upper extremities  -     Ambulatory Referral to Orthopedic Surgery  -     Ambulatory Referral to PT/OT Hand Therapy; Future          39 y.o. male with bilateral carpal tunnel syndrome, possible cubital tunnel syndrome, and medial epicondylitis  Anatomy of the condition/s as well as treatment options and expected outcomes were discussed.  Any pertinent imaging or lab results were reviewed with the patient.   The patient verbalized understanding of exam findings and treatment plan.   The patient was given the opportunity to ask questions.  Questions were answered to the patient's satisfaction.  The MSK US was reviewed which demonstrates carpal tunnel ruled in bilaterally.  I discussed unfortunately, the cubital tunnel ultrasounds are not interpreted at this time.   Treatment options were discussed in the form of bracing, formal therapy, and possible steroid injections.  I discussed the injections can be diagnostic and therapeutic.  The patient decided to move forward with the above plan.  A referral was provided to OT which he may attend one session and transition to [facility].  He was instructed to get a counterforce brace OTC.  He will continue with night time bracing.  He consented and underwent a right carpal tunnel injection in the office today without any complications.      Follow Up:  2 months       To Do Next Visit:  Re-evaluation of current issue      Discussions:  Carpal Tunnel Syndrome: The anatomy and physiology of carpal tunnel syndrome was discussed with the patient today.   Increase pressure localized under the transverse carpal ligament can cause pain, numbness, tingling, or dysesthesias within the median nerve distribution as well as feelings of fatigue, clumsiness, or awkwardness.  These symptoms typically occur at night and worse in the morning upon waking.  Eventually, untreated carpal tunnel syndrome can result in weakness and permanent loss of muscle within the thenar compartment of the hand.  Treatment options were discussed with the patient.  Conservative treatment includes nocturnal resting splints to keep the nerve in a neutral position, ergonomic changes within the work or home environment, activity modification, and tendon gliding exercises. Vitamin B6 one tablet daily over the counter may helpful to reduce symptoms.   Steroid injections within the carpal canal can help a majority of patients, however this is often self-limited in a majority of patients.  Surgical intervention to divide the transverse carpal ligament typically results in a long-lasting relief of the patient's complaints, with the recurrence rate of less than 1%.                                                                                                                                                                         , Cubital Tunnel Syndrome: The anatomy and physiology of cubital tunnel syndrome were discussed with the patient today in the office.  Typically, increased elbow flexion activities decrease blood flow within the intraneural spaces, resulting in a feeling of numbness, tingling, weakness, or clumsiness within the hand and fingers.  Occasionally, anatomic structures such as medial elbow osteophytes, the medial head of the triceps, were subluxing ulnar nerve may result in increased pressure or aggravation at the cubital tunnel.  Typical signs and symptoms usually include numbness and tingling within the ring and small finger, weakness with , and weakness with pinch.  Conservative  "treatment and includes nocturnal bracing to keep the elbow in a semi-extended position, activity modification, therapy, and avoiding excessive elbow flexion activities.  Vitamin B6 one tablet daily over the counter may helpful to reduce symptoms.  A majority of patients typically respond to conservative treatment over a period of approximately 3-6 months.  EMG/NCV testing of the ulnar nerve at the elbow is not as reliable as carpal tunnel syndrome.  Surgical intervention in the form of in situ release of the ulnar nerve at the elbow or ulnar nerve transposition may be required in up to 20% of patients. , and Medial Epicondylitis: The anatomy and physiology of medial epicondylitis was discussed with the patient today.  Typically, degenerative changes in the flexor pronator mass occur over time.  These degenerative changes appear as tears of the tendon on MRI.  This creates pain over the medial epicondyle.  This pain typically is made worse with palm up lifting activities as well as anything that involves strength and stability of the wrist.  The pain may radiate from the wrist up to the elbow.  At times, the shoulder may be weak as well which can predispose or cause continuation of the problem.  Conservative treatment usually cures a vast majority of patients; however, this may take up to 6-9 months.  Conservative treatment options typically include activity modification, therapy for strengthening of the shoulder and elbow, tennis elbow strap, and possible corticosteroid injections.  Corticosteroid injections are very effective at relieving pain but do not alter the natural history of this process.  Rather, steroid injections decrease the pain temporarily to allow for therapy to take place without discomfort. It was discussed that therapy to prevent recurrence is a \"life long\" process and that if patient relies on the steroid injection alone without performing therapeutic exercises the risk of recurrence is likely. " Typical home regimen includes heat,  stretching and resisted wrist flex ion and forearm rotation exercises discussed in the office.  Surgery is required in fewer than 5% of patients.  At times, the ulnar nerve may be aggravated with this condition.       Mode Ellison MD  Attending, Orthopaedic Surgery  Hand, Wrist, and Elbow Surgery  [facility] Orthopaedic [facility]    ______________________________________________________________________________________________    CHIEF COMPLAINT:  Chief Complaint   Patient presents with    Left Wrist - Pain    Right Wrist - Pain    Right Hand - Pain    Left Hand - Pain       SUBJECTIVE:  Patient is a 39 y.o. RHD male who presents today for evaluation and treatment of bilateral hand pain and numbness and tingling. The patient notes pain into the palm of his hand. He notes numbness and tingling into the fingers. He states this has been ongoing for 2 years and has been getting progressively worse. He states he will have to shack his hands out. He notes difficulty holding objects. He states his symptoms are intermittent and was worse when he was working. He states this can wake him from sleep. He has tried night time bracing which helps some. He states the numbness and tingling is primarily in his long and ring fingers bilaterally.      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      I have personally reviewed all the relevant PMH, PSH, SH, FH, Medications and allergies      PAST MEDICAL HISTORY:  Past Medical History:   Diagnosis Date    Colon polyp     Depression     A little over a year    Webbed penis 07/08/2021       PAST SURGICAL HISTORY:  Past Surgical History:   Procedure Laterality Date    COLONOSCOPY  2019    NM SCROTOPLASTY SIMPLE N/A 07/01/2021    Procedure: SCROTOPLASTY;  Surgeon: Oli Lomas MD;  Location: [facility] OR;  Service: Urology    VASECTOMY      VASECTOMY  2016       FAMILY HISTORY:  Family History   Problem Relation Age  of Onset    Heart disease Mother     No Known Problems Father     No Known Problems Daughter     No Known Problems Daughter     No Known Problems Paternal Grandmother     Colon cancer Paternal Grandfather     No Known Problems Son     Breast cancer Neg Hx        SOCIAL HISTORY:  Social History     Tobacco Use    Smoking status: Former     Current packs/day: 0.00     Types: Cigarettes, Cigars     Quit date: 04/2021     Years since quitting: 3.8    Smokeless tobacco: Never   Vaping Use    Vaping status: Never Used   Substance Use Topics    Alcohol use: Yes     Comment: social 3x a year    Drug use: Never       MEDICATIONS:    Current Outpatient Medications:     b complex vitamins tablet, Take 1 tablet by mouth daily, Disp: , Rfl:     diclofenac sodium (VOLTAREN) 50 mg EC tablet, Take 1 tablet (50 mg total) by mouth 2 (two) times a day, Disp: 60 tablet, Rfl: 0    Minoxidil 5 % FOAM, Apply 1 Dose topically 2 (two) times a day, Disp: 60 g, Rfl: 0    multivitamin (THERAGRAN) TABS, Take 1 tablet by mouth daily, Disp: , Rfl:     ALLERGIES:  No Known Allergies        REVIEW OF SYSTEMS:  Musculoskeletal:        As noted in HPI.   All other systems reviewed and are negative.    VITALS:  There were no vitals filed for this visit.    LABS:  HgA1c:   Lab Results   Component Value Date    HGBA1C 5.6 06/06/2024     BMP:   Lab Results   Component Value Date    CALCIUM 9.4 06/06/2024    K 4.4 06/06/2024    CO2 31 06/06/2024     06/06/2024    BUN 17 06/06/2024    CREATININE 1.26 06/06/2024       _____________________________________________________  PHYSICAL EXAMINATION:  General: Well developed and well nourished, alert & oriented x 3, appears comfortable  Psychiatric: Normal  HEENT: Normocephalic, Atraumatic Trachea Midline, No torticollis  Pulmonary: No audible wheezing or respiratory distress   Abdomen/GI: Non tender, non distended   Cardiovascular: No pitting edema, 2+ radial pulse   Skin: No masses, erythema, lacerations,  fluctation, ulcerations  Neurovascular: Sensation Intact to the Median, Ulnar, Radial Nerve, Motor Intact to the Median, Ulnar, Radial Nerve, and Pulses Intact  Musculoskeletal: Normal, except as noted in detailed exam and in HPI.      MUSCULOSKELETAL EXAMINATION:  BUE  5/5 finger abduction   Able to cross fingers over  4/5 APB  Mild thenar atrophy  + tinel's  + Phalen's   No obvious ulnar nerve subluxation   TTP medial epicondyle   ___________________________________________________  STUDIES REVIEWED:  Ultrasound report was reviewed and demonstrates carpal tunnel ruled in bilaterally.           PROCEDURES PERFORMED:  Hand/upper extremity injection: R carpal tunnel  Cunningham Protocol:  procedure performed by consultantConsent: Verbal consent obtained.  Consent given by: patient  Patient identity confirmed: verbally with patient  Supporting Documentation  Indications: pain   Procedure Details  Condition:carpal tunnel syndrome Site: R carpal tunnel   Needle size: 25 G  Ultrasound guidance: no  Medications administered: 6 mg betamethasone acetate-betamethasone sodium phosphate 6 (3-3) mg/mL; 1 mL ropivacaine 0.5 %  Patient tolerance: patient tolerated the procedure well with no immediate complications  Dressing:  Sterile dressing applied              _____________________________________________________      Scribe Attestation      I,:  Maryann Mott MA am acting as a scribe while in the presence of the attending physician.:       I,:  Mode Ellison MD personally performed the services described in this documentation    as scribed in my presence.:

## 2025-02-10 ENCOUNTER — RESULTS FOLLOW-UP (OUTPATIENT)
Dept: OBGYN CLINIC | Facility: OTHER | Age: 40
End: 2025-02-10

## 2025-03-28 ENCOUNTER — OCCMED (OUTPATIENT)
Age: 40
End: 2025-03-28
Payer: COMMERCIAL

## 2025-03-28 ENCOUNTER — APPOINTMENT (OUTPATIENT)
Age: 40
End: 2025-03-28
Payer: COMMERCIAL

## 2025-03-28 DIAGNOSIS — Z02.1 PRE-EMPLOYMENT EXAMINATION: Primary | ICD-10-CM

## 2025-03-28 DIAGNOSIS — Z02.1 PRE-EMPLOYMENT EXAMINATION: ICD-10-CM

## 2025-03-28 PROCEDURE — 71045 X-RAY EXAM CHEST 1 VIEW: CPT

## 2025-04-09 ENCOUNTER — OFFICE VISIT (OUTPATIENT)
Dept: FAMILY MEDICINE CLINIC | Facility: CLINIC | Age: 40
End: 2025-04-09
Payer: COMMERCIAL

## 2025-04-09 VITALS
TEMPERATURE: 98.1 F | HEART RATE: 71 BPM | OXYGEN SATURATION: 98 % | WEIGHT: 231 LBS | DIASTOLIC BLOOD PRESSURE: 84 MMHG | HEIGHT: 71 IN | SYSTOLIC BLOOD PRESSURE: 138 MMHG | BODY MASS INDEX: 32.34 KG/M2

## 2025-04-09 DIAGNOSIS — H04.553 STENOSIS OF BOTH LACRIMAL DUCTS: Primary | ICD-10-CM

## 2025-04-09 DIAGNOSIS — R09.81 SINUS CONGESTION: ICD-10-CM

## 2025-04-09 PROCEDURE — 99214 OFFICE O/P EST MOD 30 MIN: CPT | Performed by: STUDENT IN AN ORGANIZED HEALTH CARE EDUCATION/TRAINING PROGRAM

## 2025-04-09 RX ORDER — METHYLPREDNISOLONE 4 MG/1
TABLET ORAL
Qty: 21 EACH | Refills: 0 | Status: SHIPPED | OUTPATIENT
Start: 2025-04-09

## 2025-04-09 NOTE — PROGRESS NOTES
"Name: Davide Smith      : 1985      MRN: [id number]  Encounter Provider: Valerie Bustamante MD  Encounter Date: 2025   Encounter department: [facility] [address]  :  Assessment & Plan  Stenosis of both lacrimal ducts    Orders:  •  Ambulatory Referral to Otolaryngology; Future    Sinus congestion    Orders:  •  Ambulatory Referral to Otolaryngology; Future  •  methylPREDNISolone 4 MG tablet therapy pack; Use as directed on package    Given the history would recommend a facial/ENT consultation       History of Present Illness   HPI    Patient coming in to be seen for some facial and sinus concerns.  Couple years ago he got an eyelid surgery in [place] and was told that his tear ducts would be blocked from this.  The last couple weeks he has been doing worsening excess tear production, puffiness around the face and even some sinus pressure/headache.  He saw dermatologist for the bumps and was given a cortisone cream    Review of Systems   Constitutional:  Negative for activity change, appetite change, fatigue and fever.   HENT:  Positive for sinus pain. Negative for congestion, rhinorrhea and sore throat.    Eyes:  Negative for photophobia, pain, discharge, redness, itching and visual disturbance.   Respiratory:  Negative for cough and shortness of breath.    Cardiovascular:  Negative for chest pain.   Gastrointestinal:  Negative for nausea and vomiting.       Objective   /84   Pulse 71   Temp 98.1 °F (36.7 °C)   Ht 5' 11\" (1.803 m)   Wt 105 kg (231 lb)   SpO2 98%   BMI 32.22 kg/m²      Physical Exam  Constitutional:       General: He is not in acute distress.     Appearance: Normal appearance. He is not ill-appearing, toxic-appearing or diaphoretic.   HENT:      Head: Normocephalic and atraumatic.      Nose: Mucosal edema and congestion present. No rhinorrhea.   Eyes:      Conjunctiva/sclera: Conjunctivae normal.   Neurological:      Mental Status: He is " alert.          [id number] Detailed

## 2025-04-17 ENCOUNTER — OFFICE VISIT (OUTPATIENT)
Dept: FAMILY MEDICINE CLINIC | Facility: CLINIC | Age: 40
End: 2025-04-17
Payer: COMMERCIAL

## 2025-04-17 VITALS
WEIGHT: 245.6 LBS | HEART RATE: 84 BPM | BODY MASS INDEX: 34.38 KG/M2 | HEIGHT: 71 IN | OXYGEN SATURATION: 99 % | DIASTOLIC BLOOD PRESSURE: 88 MMHG | TEMPERATURE: 97.6 F | RESPIRATION RATE: 16 BRPM | SYSTOLIC BLOOD PRESSURE: 138 MMHG

## 2025-04-17 DIAGNOSIS — Z00.00 ANNUAL PHYSICAL EXAM: Primary | ICD-10-CM

## 2025-04-17 DIAGNOSIS — Z13.6 SCREENING FOR CARDIOVASCULAR CONDITION: ICD-10-CM

## 2025-04-17 DIAGNOSIS — Z13.1 SCREENING FOR DIABETES MELLITUS: ICD-10-CM

## 2025-04-17 DIAGNOSIS — R03.0 ELEVATED BLOOD PRESSURE READING: ICD-10-CM

## 2025-04-17 PROCEDURE — 99396 PREV VISIT EST AGE 40-64: CPT | Performed by: STUDENT IN AN ORGANIZED HEALTH CARE EDUCATION/TRAINING PROGRAM

## 2025-04-17 RX ORDER — KETOCONAZOLE 20 MG/ML
SHAMPOO, SUSPENSION TOPICAL
COMMUNITY
Start: 2025-04-07

## 2025-04-17 RX ORDER — HYDROCORTISONE 25 MG/G
CREAM TOPICAL
COMMUNITY
Start: 2025-04-07

## 2025-04-17 NOTE — PATIENT INSTRUCTIONS
"Patient Education     Routine physical for adults   The Basics   Written by the doctors and editors at [facility]   What is a physical? -- A physical is a routine visit, or \"check-up,\" with your doctor. You might also hear it called a \"wellness visit\" or \"preventive visit.\"  During each visit, the doctor will:   Ask about your physical and mental health   Ask about your habits, behaviors, and lifestyle   Do an exam   Give you vaccines if needed   Talk to you about any medicines you take   Give advice about your health   Answer your questions  Getting regular check-ups is an important part of taking care of your health. It can help your doctor find and treat any problems you have. But it's also important for preventing health problems.  A routine physical is different from a \"sick visit.\" A sick visit is when you see a doctor because of a health concern or problem. Since physicals are scheduled ahead of time, you can think about what you want to ask the doctor.  How often should I get a physical? -- It depends on your age and health. In general, for people age 21 years and older:   If you are younger than 50 years, you might be able to get a physical every 3 years.   If you are 50 years or older, your doctor might recommend a physical every year.  If you have an ongoing health condition, like diabetes or high blood pressure, your doctor will probably want to see you more often.  What happens during a physical? -- In general, each visit will include:   Physical exam - The doctor or nurse will check your height, weight, heart rate, and blood pressure. They will also look at your eyes and ears. They will ask about how you are feeling and whether you have any symptoms that bother you.   Medicines - It's a good idea to bring a list of all the medicines you take to each doctor visit. Your doctor will talk to you about your medicines and answer any questions. Tell them if you are having any side effects that bother you. You " "should also tell them if you are having trouble paying for any of your medicines.   Habits and behaviors - This includes:   Your diet   Your exercise habits   Whether you smoke, drink alcohol, or use drugs   Whether you are sexually active   Whether you feel safe at home  Your doctor will talk to you about things you can do to improve your health and lower your risk of health problems. They will also offer help and support. For example, if you want to quit smoking, they can give you advice and might prescribe medicines. If you want to improve your diet or get more physical activity, they can help you with this, too.   Lab tests, if needed - The tests you get will depend on your age and situation. For example, your doctor might want to check your:   Cholesterol   Blood sugar   Iron level   Vaccines - The recommended vaccines will depend on your age, health, and what vaccines you already had. Vaccines are very important because they can prevent certain serious or deadly infections.   Discussion of screening - \"Screening\" means checking for diseases or other health problems before they cause symptoms. Your doctor can recommend screening based on your age, risk, and preferences. This might include tests to check for:   Cancer, such as breast, prostate, cervical, ovarian, colorectal, prostate, lung, or skin cancer   Sexually transmitted infections, such as chlamydia and gonorrhea   Mental health conditions like depression and anxiety  Your doctor will talk to you about the different types of screening tests. They can help you decide which screenings to have. They can also explain what the results might mean.   Answering questions - The physical is a good time to ask the doctor or nurse questions about your health. If needed, they can refer you to other doctors or specialists, too.  Adults older than 65 years often need other care, too. As you get older, your doctor will talk to you about:   How to prevent falling at " home   Hearing or vision tests   Memory testing   How to take your medicines safely   Making sure that you have the help and support you need at home  All topics are updated as new evidence becomes available and our peer review process is complete.  This topic retrieved from [employer] on: May 02, 2024.  Topic 315144 Version 1.0  Release: 32.4.3 - C32.122  © 2024 UpToDate, Inc. and/or its affiliates. All rights reserved.  Consumer Information Use and Disclaimer   Disclaimer: This generalized information is a limited summary of diagnosis, treatment, and/or medication information. It is not meant to be comprehensive and should be used as a tool to help the user understand and/or assess potential diagnostic and treatment options. It does NOT include all information about conditions, treatments, medications, side effects, or risks that may apply to a specific patient. It is not intended to be medical advice or a substitute for the medical advice, diagnosis, or treatment of a health care provider based on the health care provider's examination and assessment of a patient's specific and unique circumstances. Patients must speak with a health care provider for complete information about their health, medical questions, and treatment options, including any risks or benefits regarding use of medications. This information does not endorse any treatments or medications as safe, effective, or approved for treating a specific patient. UpToDate, Inc. and its affiliates disclaim any warranty or liability relating to this information or the use thereof.The use of this information is governed by the Terms of Use, available at https://www.woltersRotaryViewuwer.com/en/know/clinical-effectiveness-terms. 2024© UpToDate, Inc. and its affiliates and/or licensors. All rights reserved.  Copyright   © 2024 UpToDate, Inc. and/or its affiliates. All rights reserved.

## 2025-04-17 NOTE — PROGRESS NOTES
"Adult Annual Physical  Name: Davide Smith      : 1985      MRN: [id number]  Encounter Provider: Valerie Bustamante MD  Encounter Date: 2025   Encounter department: [facility] [address]    :  Assessment & Plan  Annual physical exam         Screening for diabetes mellitus    Orders:  •  Hemoglobin A1C; Future  •  Comprehensive metabolic panel; Future    Screening for cardiovascular condition    Orders:  •  Lipid panel; Future    BMI 34.0-34.9,adult    Orders:  •  Comprehensive metabolic panel; Future  •  TSH, 3rd generation with Free T4 reflex; Future  •  CBC and Platelet; Future    Elevated blood pressure reading  Possibly from recent medrol dose pack           Preventive Screenings:    - Prostate cancer screening: screening not indicated     Immunizations:  - Immunizations due: Influenza and Tdap         History of Present Illness     Adult Annual Physical:  Patient presents for annual physical.     Diet and Physical Activity:  - Diet/Nutrition: well balanced diet, limited junk food and adequate fiber intake.  - Exercise: no formal exercise.    General Health:  - Sleep: sleeps poorly.  - Hearing: normal hearing bilateral ears.  - Vision: wears glasses.  - Dental: brushes teeth twice daily.    /GYN Health:  - Follows with GYN: no.   - History of STDs: no     Health:  - History of STDs: no.   - Urinary symptoms: none.     Advanced Care Planning:  - Has an advanced directive?: no    - Has a durable medical POA?: no      Review of Systems      Objective   /88 (BP Location: Left arm, Patient Position: Sitting)   Pulse 84   Temp 97.6 °F (36.4 °C)   Resp 16   Ht 5' 11\" (1.803 m)   Wt 111 kg (245 lb 9.6 oz)   SpO2 99%   BMI 34.25 kg/m²     Physical Exam    "

## 2025-04-21 ENCOUNTER — TELEPHONE (OUTPATIENT)
Age: 40
End: 2025-04-21

## 2025-04-28 ENCOUNTER — APPOINTMENT (OUTPATIENT)
Dept: LAB | Facility: CLINIC | Age: 40
End: 2025-04-28
Payer: COMMERCIAL

## 2025-04-28 DIAGNOSIS — Z13.1 SCREENING FOR DIABETES MELLITUS: ICD-10-CM

## 2025-04-28 DIAGNOSIS — Z13.6 SCREENING FOR CARDIOVASCULAR CONDITION: ICD-10-CM

## 2025-04-28 LAB
ALBUMIN SERPL BCG-MCNC: 4.3 G/DL (ref 3.5–5)
ALP SERPL-CCNC: 55 U/L (ref 34–104)
ALT SERPL W P-5'-P-CCNC: 40 U/L (ref 7–52)
ANION GAP SERPL CALCULATED.3IONS-SCNC: 4 MMOL/L (ref 4–13)
AST SERPL W P-5'-P-CCNC: 28 U/L (ref 13–39)
BILIRUB SERPL-MCNC: 1.25 MG/DL (ref 0.2–1)
BUN SERPL-MCNC: 16 MG/DL (ref 5–25)
CALCIUM SERPL-MCNC: 9.3 MG/DL (ref 8.4–10.2)
CHLORIDE SERPL-SCNC: 103 MMOL/L (ref 96–108)
CHOLEST SERPL-MCNC: 175 MG/DL (ref ?–200)
CO2 SERPL-SCNC: 33 MMOL/L (ref 21–32)
CREAT SERPL-MCNC: 1.25 MG/DL (ref 0.6–1.3)
ERYTHROCYTE [DISTWIDTH] IN BLOOD BY AUTOMATED COUNT: 12.7 % (ref 11.6–15.1)
EST. AVERAGE GLUCOSE BLD GHB EST-MCNC: 103 MG/DL
GFR SERPL CREATININE-BSD FRML MDRD: 71 ML/MIN/1.73SQ M
GLUCOSE P FAST SERPL-MCNC: 93 MG/DL (ref 65–99)
HBA1C MFR BLD: 5.2 %
HCT VFR BLD AUTO: 47.3 % (ref 36.5–49.3)
HDLC SERPL-MCNC: 51 MG/DL
HGB BLD-MCNC: 16 G/DL (ref 12–17)
LDLC SERPL CALC-MCNC: 111 MG/DL (ref 0–100)
MCH RBC QN AUTO: 32.2 PG (ref 26.8–34.3)
MCHC RBC AUTO-ENTMCNC: 33.8 G/DL (ref 31.4–37.4)
MCV RBC AUTO: 95 FL (ref 82–98)
NONHDLC SERPL-MCNC: 124 MG/DL
PLATELET # BLD AUTO: 213 THOUSANDS/UL (ref 149–390)
PMV BLD AUTO: 9.8 FL (ref 8.9–12.7)
POTASSIUM SERPL-SCNC: 4.1 MMOL/L (ref 3.5–5.3)
PROT SERPL-MCNC: 7 G/DL (ref 6.4–8.4)
RBC # BLD AUTO: 4.97 MILLION/UL (ref 3.88–5.62)
SODIUM SERPL-SCNC: 140 MMOL/L (ref 135–147)
TRIGL SERPL-MCNC: 63 MG/DL (ref ?–150)
TSH SERPL DL<=0.05 MIU/L-ACNC: 2.67 UIU/ML (ref 0.45–4.5)
WBC # BLD AUTO: 3.33 THOUSAND/UL (ref 4.31–10.16)

## 2025-04-28 PROCEDURE — 83036 HEMOGLOBIN GLYCOSYLATED A1C: CPT

## 2025-04-28 PROCEDURE — 80061 LIPID PANEL: CPT

## 2025-04-28 PROCEDURE — 36415 COLL VENOUS BLD VENIPUNCTURE: CPT

## 2025-04-28 PROCEDURE — 80053 COMPREHEN METABOLIC PANEL: CPT

## 2025-04-28 PROCEDURE — 85027 COMPLETE CBC AUTOMATED: CPT

## 2025-04-28 PROCEDURE — 84443 ASSAY THYROID STIM HORMONE: CPT

## 2025-04-29 ENCOUNTER — RESULTS FOLLOW-UP (OUTPATIENT)
Dept: FAMILY MEDICINE CLINIC | Facility: CLINIC | Age: 40
End: 2025-04-29

## 2025-05-05 ENCOUNTER — OFFICE VISIT (OUTPATIENT)
Dept: OBGYN CLINIC | Facility: CLINIC | Age: 40
End: 2025-05-05
Payer: COMMERCIAL

## 2025-05-05 DIAGNOSIS — G56.02 CARPAL TUNNEL SYNDROME ON LEFT: ICD-10-CM

## 2025-05-05 DIAGNOSIS — G56.01 CARPAL TUNNEL SYNDROME ON RIGHT: Primary | ICD-10-CM

## 2025-05-05 PROBLEM — F32.A ANXIETY AND DEPRESSION: Status: ACTIVE | Noted: 2021-03-15

## 2025-05-05 PROBLEM — F41.9 ANXIETY AND DEPRESSION: Status: ACTIVE | Noted: 2021-03-15

## 2025-05-05 PROBLEM — Z02.9 ENCOUNTER FOR ADMINISTRATIVE EXAMINATIONS, UNSPECIFIED: Status: ACTIVE | Noted: 2024-03-04

## 2025-05-05 PROCEDURE — 99214 OFFICE O/P EST MOD 30 MIN: CPT | Performed by: STUDENT IN AN ORGANIZED HEALTH CARE EDUCATION/TRAINING PROGRAM

## 2025-05-05 RX ORDER — SODIUM CHLORIDE, SODIUM LACTATE, POTASSIUM CHLORIDE, CALCIUM CHLORIDE 600; 310; 30; 20 MG/100ML; MG/100ML; MG/100ML; MG/100ML
20 INJECTION, SOLUTION INTRAVENOUS CONTINUOUS
OUTPATIENT
Start: 2025-05-05

## 2025-05-05 RX ORDER — CHLORHEXIDINE GLUCONATE ORAL RINSE 1.2 MG/ML
15 SOLUTION DENTAL ONCE
OUTPATIENT
Start: 2025-05-05 | End: 2025-05-05

## 2025-05-05 NOTE — PROGRESS NOTES
ORTHOPAEDIC HAND, WRIST, AND ELBOW OFFICE  VISIT      ASSESSMENT/PLAN:      Assessment & Plan  Carpal tunnel syndrome on right  - Schedule a carpal tunnel release       Carpal tunnel syndrome on left  - Plan for a possible carpal tunnel release at least 6 weeks after the right         Anatomy of the condition/s as well as treatment options and expected outcomes were discussed.  Any pertinent imaging or lab results were reviewed with the patient.  The patient verbalized understanding of exam findings and treatment plan.   The patient was given the opportunity to ask questions.  Questions were answered to the patient's satisfaction.  Discussed multiple options for the CTS including observation, CSI, and surgical release  Discussed that the US showed possible CuTS, but this is negative on exam today. Discussed option of cubital tunnel release at same time as carpal tunnel release. Discussed as carpal tunnel injection helped with majority of symptoms, cubital tunnel release may not be necessary.  The patient decided to move forward with RIGHT CTR endoscopic vs open to be followed by the left at a later date.      Follow Up:  After surgery       To Do Next Visit:  Re-evaluation of current issue      Discussions:  Carpal Tunnel Syndrome: The anatomy and physiology of carpal tunnel syndrome was discussed with the patient today.  Increase pressure localized under the transverse carpal ligament can cause pain, numbness, tingling, or dysesthesias within the median nerve distribution as well as feelings of fatigue, clumsiness, or awkwardness.  These symptoms typically occur at night and worse in the morning upon waking.  Eventually, untreated carpal tunnel syndrome can result in weakness and permanent loss of muscle within the thenar compartment of the hand.  Treatment options were discussed with the patient.  Conservative treatment includes nocturnal resting splints to keep the nerve in a neutral position, ergonomic changes  within the work or home environment, activity modification, and tendon gliding exercises. Vitamin B6 one tablet daily over the counter may helpful to reduce symptoms.   Steroid injections within the carpal canal can help a majority of patients, however this is often self-limited in a majority of patients.  Surgical intervention to divide the transverse carpal ligament typically results in a long-lasting relief of the patient's complaints, with the recurrence rate of less than 1%.                                                                                                                                                                          and Cubital Tunnel Syndrome: The anatomy and physiology of cubital tunnel syndrome were discussed with the patient today in the office.  Typically, increased elbow flexion activities decrease blood flow within the intraneural spaces, resulting in a feeling of numbness, tingling, weakness, or clumsiness within the hand and fingers.  Occasionally, anatomic structures such as medial elbow osteophytes, the medial head of the triceps, were subluxing ulnar nerve may result in increased pressure or aggravation at the cubital tunnel.  Typical signs and symptoms usually include numbness and tingling within the ring and small finger, weakness with , and weakness with pinch.  Conservative treatment and includes nocturnal bracing to keep the elbow in a semi-extended position, activity modification, therapy, and avoiding excessive elbow flexion activities.  Vitamin B6 one tablet daily over the counter may helpful to reduce symptoms.  A majority of patients typically respond to conservative treatment over a period of approximately 3-6 months.  EMG/NCV testing of the ulnar nerve at the elbow is not as reliable as carpal tunnel syndrome.  Surgical intervention in the form of in situ release of the ulnar nerve at the elbow or ulnar nerve transposition may be required in up to 20% of  patients.       Mode Ellison MD  Attending, Orthopaedic Surgery  Hand, Wrist, and Elbow Surgery  [facility]    ______________________________________________________________________________________________    CHIEF COMPLAINT:  Chief Complaint   Patient presents with    Left Wrist - Pain    Right Wrist - Pain    Right Hand - Pain    Left Hand - Pain       SUBJECTIVE:  Patient is a 39 y.o. RHD male who presents today for follow up of b/l CTS, b/l CuTS, b/l medial epicondylitis.  At his last visit on 2/7/25, he received a right CTS injection and was referred to therapy.  He was also recommended to use counterforce bracing for the elbows and night splinting for the carpal tunnels.       Today he states the carpal tunnel injection gave him 95% relief of both the wrist and elbow symptoms for several weeks.  He had a side effect of hiccups for about 24 hours following the injection and does not want to do that again.  The right is worse than the left.  The left bothers him 60% as much as the right.       The long and ring fingers bother him most - occasionally the index and thumb.  The small is sparred.     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    I have personally reviewed all the relevant PMH, PSH, SH, FH, Medications and allergies      PAST MEDICAL HISTORY:  Past Medical History:   Diagnosis Date    Colon polyp     Depression     A little over a year    Webbed penis 07/08/2021       PAST SURGICAL HISTORY:  Past Surgical History:   Procedure Laterality Date    COLONOSCOPY  2019    MA SCROTOPLASTY SIMPLE N/A 07/01/2021    Procedure: SCROTOPLASTY;  Surgeon: Oli Lomas MD;  Location: [facility];  Service: Urology    VASECTOMY      VASECTOMY  2016       FAMILY HISTORY:  Family History   Problem Relation Age of Onset    Heart disease Mother     No Known Problems Father     No Known Problems Daughter     No Known Problems Daughter     No Known Problems  Paternal Grandmother     Colon cancer Paternal Grandfather     No Known Problems Son     Breast cancer Neg Hx        SOCIAL HISTORY:  Social History     Tobacco Use    Smoking status: Former     Current packs/day: 0.00     Types: Cigarettes, Cigars     Quit date: 2021     Years since quittin.0    Smokeless tobacco: Never   Vaping Use    Vaping status: Never Used   Substance Use Topics    Alcohol use: Yes     Comment: social 3x a year    Drug use: Never       MEDICATIONS:    Current Outpatient Medications:     b complex vitamins tablet, Take 1 tablet by mouth daily, Disp: , Rfl:     diclofenac sodium (VOLTAREN) 50 mg EC tablet, Take 1 tablet (50 mg total) by mouth 2 (two) times a day, Disp: 60 tablet, Rfl: 0    hydrocortisone 2.5 % cream, APPLY TOPICALLY TO THE AFFECTED AREA OF FACE TWICE DAILY FOR 2 WEEKS, Disp: , Rfl:     ketoconazole (NIZORAL) 2 % shampoo, APPLY TOPICALLY TO THE AFFECTED AREA ON NOSE FOR 5 MINUTES THEN RINSE 3 TIMES A WEEK AS NEEDED, Disp: , Rfl:     methylPREDNISolone 4 MG tablet therapy pack, Use as directed on package, Disp: 21 each, Rfl: 0    Minoxidil 5 % FOAM, Apply 1 Dose topically 2 (two) times a day, Disp: 60 g, Rfl: 0    multivitamin (THERAGRAN) TABS, Take 1 tablet by mouth daily, Disp: , Rfl:     ALLERGIES:  No Known Allergies        REVIEW OF SYSTEMS:  Musculoskeletal:        As noted in HPI.   All other systems reviewed and are negative.    VITALS:  There were no vitals filed for this visit.    LABS:  HgA1c:   Lab Results   Component Value Date    HGBA1C 5.2 2025     BMP:   Lab Results   Component Value Date    CALCIUM 9.3 2025    K 4.1 2025    CO2 33 (H) 2025     2025    BUN 16 2025    CREATININE 1.25 2025       _____________________________________________________  PHYSICAL EXAMINATION:  General: Well developed and well nourished, alert & oriented x 3, appears comfortable  Psychiatric: Normal  HEENT: Normocephalic, Atraumatic  Trachea Midline, No torticollis  Pulmonary: No audible wheezing or respiratory distress   Abdomen/GI: Non tender, non distended   Cardiovascular: No pitting edema, 2+ radial pulse   Skin: No masses, erythema, lacerations, fluctation, ulcerations  Neurovascular: Sensation Intact to the Median, Ulnar, Radial Nerve, Motor Intact to the Median, Ulnar, Radial Nerve, and Pulses Intact  Musculoskeletal: Normal, except as noted in detailed exam and in HPI.      MUSCULOSKELETAL EXAMINATION:  B/L Upper extremities:    No thenar atrophy.  No 1st dorsal interosseous atrophy.  Positive Tinel's over median nerve at the wrists  Positive Phalen's test.  Negative Tinel's over ulnar nerve at the elbows  Negative ulnar nerve subluxation.  Negative elbow flexion compression test.    APB 5/5   Finger abduction 5/5.  AIN 5/5.  5/5 FPL  Able to cross fingers      2 point discrimination:   Right radial (mm) Right ulnar (mm) Left radial (mm) Left ulnar (mm)   Thumb 5 5 5 5   Index 5 6 6 5   Long 5 5 5 5   Ring 5 5 7 5   Small 5 5 5 5         ___________________________________________________  STUDIES REVIEWED:  US b/l wrists: + BL CTS  US b/l elbows: + BL CuTS        PROCEDURES PERFORMED:  Procedures  No Procedures performed today    _____________________________________________________      Scribe Attestation      I,:   am acting as a scribe while in the presence of the attending physician.:       I,:   personally performed the services described in this documentation    as scribed in my presence.:

## 2025-05-19 ENCOUNTER — TELEPHONE (OUTPATIENT)
Age: 40
End: 2025-05-19

## 2025-05-19 NOTE — TELEPHONE ENCOUNTER
Caller: patient    Doctor: Dr. Ellison     Reason for call: patient requesting to reschedule upcoming surgery     Call back#: [phone]

## 2025-07-16 ENCOUNTER — APPOINTMENT (OUTPATIENT)
Dept: LAB | Facility: HOSPITAL | Age: 40
End: 2025-07-16
Payer: COMMERCIAL

## 2025-07-16 DIAGNOSIS — G56.01 CARPAL TUNNEL SYNDROME ON RIGHT: ICD-10-CM

## 2025-07-16 DIAGNOSIS — J30.89 NON-SEASONAL ALLERGIC RHINITIS, UNSPECIFIED TRIGGER: ICD-10-CM

## 2025-07-16 LAB
ANION GAP SERPL CALCULATED.3IONS-SCNC: 6 MMOL/L (ref 4–13)
BASOPHILS # BLD AUTO: 0.02 THOUSANDS/ÂΜL (ref 0–0.1)
BASOPHILS NFR BLD AUTO: 1 % (ref 0–1)
BUN SERPL-MCNC: 14 MG/DL (ref 5–25)
CALCIUM SERPL-MCNC: 9.8 MG/DL (ref 8.4–10.2)
CHLORIDE SERPL-SCNC: 104 MMOL/L (ref 96–108)
CO2 SERPL-SCNC: 30 MMOL/L (ref 21–32)
CREAT SERPL-MCNC: 1.28 MG/DL (ref 0.6–1.3)
EOSINOPHIL # BLD AUTO: 0.05 THOUSAND/ÂΜL (ref 0–0.61)
EOSINOPHIL NFR BLD AUTO: 1 % (ref 0–6)
ERYTHROCYTE [DISTWIDTH] IN BLOOD BY AUTOMATED COUNT: 13.1 % (ref 11.6–15.1)
GFR SERPL CREATININE-BSD FRML MDRD: 69 ML/MIN/1.73SQ M
GLUCOSE P FAST SERPL-MCNC: 85 MG/DL (ref 65–99)
HCT VFR BLD AUTO: 45.5 % (ref 36.5–49.3)
HGB BLD-MCNC: 15.5 G/DL (ref 12–17)
IMM GRANULOCYTES # BLD AUTO: 0.01 THOUSAND/UL (ref 0–0.2)
IMM GRANULOCYTES NFR BLD AUTO: 0 % (ref 0–2)
LYMPHOCYTES # BLD AUTO: 1.94 THOUSANDS/ÂΜL (ref 0.6–4.47)
LYMPHOCYTES NFR BLD AUTO: 48 % (ref 14–44)
MCH RBC QN AUTO: 31.2 PG (ref 26.8–34.3)
MCHC RBC AUTO-ENTMCNC: 34.1 G/DL (ref 31.4–37.4)
MCV RBC AUTO: 92 FL (ref 82–98)
MONOCYTES # BLD AUTO: 0.27 THOUSAND/ÂΜL (ref 0.17–1.22)
MONOCYTES NFR BLD AUTO: 7 % (ref 4–12)
NEUTROPHILS # BLD AUTO: 1.73 THOUSANDS/ÂΜL (ref 1.85–7.62)
NEUTS SEG NFR BLD AUTO: 43 % (ref 43–75)
NRBC BLD AUTO-RTO: 0 /100 WBCS
PLATELET # BLD AUTO: 223 THOUSANDS/UL (ref 149–390)
PMV BLD AUTO: 9.6 FL (ref 8.9–12.7)
POTASSIUM SERPL-SCNC: 4.2 MMOL/L (ref 3.5–5.3)
RBC # BLD AUTO: 4.97 MILLION/UL (ref 3.88–5.62)
SODIUM SERPL-SCNC: 140 MMOL/L (ref 135–147)
WBC # BLD AUTO: 4.02 THOUSAND/UL (ref 4.31–10.16)

## 2025-07-16 PROCEDURE — 85025 COMPLETE CBC W/AUTO DIFF WBC: CPT

## 2025-07-16 PROCEDURE — 86003 ALLG SPEC IGE CRUDE XTRC EA: CPT

## 2025-07-16 PROCEDURE — 80048 BASIC METABOLIC PNL TOTAL CA: CPT

## 2025-07-16 PROCEDURE — 36415 COLL VENOUS BLD VENIPUNCTURE: CPT

## 2025-07-16 PROCEDURE — 82785 ASSAY OF IGE: CPT

## 2025-07-22 LAB
A ALTERNATA IGE QN: 11.9 KUA/I (ref 0–0.1)
A FUMIGATUS IGE QN: 1.31 KUA/I (ref 0–0.1)
BERMUDA GRASS IGE QN: <0.1 KUA/I (ref 0–0.1)
BOXELDER IGE QN: 0.12 KUA/I (ref 0–0.1)
C HERBARUM IGE QN: 0.89 KUA/I (ref 0–0.1)
CAT DANDER IGE QN: <0.1 KUA/I (ref 0–0.1)
CMN PIGWEED IGE QN: 0.11 KUA/I (ref 0–0.1)
COMMON RAGWEED IGE QN: 0.28 KUA/I (ref 0–0.1)
COTTONWOOD IGE QN: 0.13 KUA/I (ref 0–0.1)
D FARINAE IGE QN: 14.2 KUA/I (ref 0–0.1)
D PTERONYSS IGE QN: 13 KUA/I (ref 0–0.1)
DOG DANDER IGE QN: 0.51 KUA/I (ref 0–0.1)
LONDON PLANE IGE QN: 0.72 KUA/I (ref 0–0.1)
MOUSE URINE PROT IGE QN: <0.1 KUA/I (ref 0–0.1)
MT JUNIPER IGE QN: 0.13 KUA/I (ref 0–0.1)
MUGWORT IGE QN: 0.1 KUA/I (ref 0–0.1)
P NOTATUM IGE QN: 1.23 KUA/I (ref 0–0.1)
ROACH IGE QN: 1.86 KUA/I (ref 0–0.1)
SHEEP SORREL IGE QN: <0.1 KUA/I (ref 0–0.1)
SILVER BIRCH IGE QN: <0.1 KUA/I (ref 0–0.1)
TIMOTHY IGE QN: 0.12 KUA/I (ref 0–0.1)
TOTAL IGE SMQN RAST: 806 KU/L (ref 0–113)
WALNUT IGE QN: 0.15 KUA/I (ref 0–0.1)
WHITE ASH IGE QN: <0.1 KUA/I (ref 0–0.1)
WHITE ELM IGE QN: 0.15 KUA/I (ref 0–0.1)
WHITE MULBERRY IGE QN: <0.1 KUA/I (ref 0–0.1)
WHITE OAK IGE QN: <0.1 KUA/I (ref 0–0.1)

## 2025-08-08 ENCOUNTER — TELEPHONE (OUTPATIENT)
Age: 40
End: 2025-08-08

## 2025-08-08 DIAGNOSIS — J34.89 NOSE PAIN: Primary | ICD-10-CM

## 2025-08-14 ENCOUNTER — ANESTHESIA EVENT (OUTPATIENT)
Dept: ANESTHESIOLOGY | Facility: HOSPITAL | Age: 40
End: 2025-08-14

## 2025-08-14 ENCOUNTER — ANESTHESIA (OUTPATIENT)
Dept: ANESTHESIOLOGY | Facility: HOSPITAL | Age: 40
End: 2025-08-14

## (undated) DEVICE — ADHESIVE SKIN HIGH VISCOSITY EXOFIN 1ML

## (undated) DEVICE — INTENDED FOR TISSUE SEPARATION, AND OTHER PROCEDURES THAT REQUIRE A SHARP SURGICAL BLADE TO PUNCTURE OR CUT.: Brand: BARD-PARKER SAFETY BLADES SIZE 15, STERILE

## (undated) DEVICE — BETHLEHEM UNIVERSAL MINOR GEN: Brand: CARDINAL HEALTH

## (undated) DEVICE — GLOVE SRG BIOGEL 7

## (undated) DEVICE — SPECIMEN CONTAINER STERILE PEEL PACK

## (undated) DEVICE — SUT CHROMIC 4-0 PS-2 18 IN 1637G

## (undated) DEVICE — SUT VICRYL 4-0 RB-1 27 IN J214H

## (undated) DEVICE — GLOVE INDICATOR PI UNDERGLOVE SZ 7 BLUE

## (undated) DEVICE — TUBING SUCTION 5MM X 12 FT

## (undated) DEVICE — LIGHT HANDLE COVER SLEEVE DISP BLUE STELLAR

## (undated) DEVICE — Device

## (undated) DEVICE — GAUZE SPONGES,USP TYPE VII GAUZE, 12 PLY: Brand: CURITY

## (undated) DEVICE — GAUZE SPONGES,16 PLY: Brand: CURITY

## (undated) DEVICE — SUT VICRYL 2-0 SH 27 IN UNDYED J417H

## (undated) DEVICE — PAD GROUNDING ADULT

## (undated) DEVICE — PENROSE DRAIN, 18 X 3 8: Brand: CARDINAL HEALTH

## (undated) DEVICE — MEDI-VAC YANKAUER SUCTION HANDLE W/STRAIGHT TIP & CONTROL VENT: Brand: CARDINAL HEALTH

## (undated) DEVICE — SYRINGE 10ML LL